# Patient Record
Sex: FEMALE | Race: WHITE | NOT HISPANIC OR LATINO | Employment: UNEMPLOYED | URBAN - METROPOLITAN AREA
[De-identification: names, ages, dates, MRNs, and addresses within clinical notes are randomized per-mention and may not be internally consistent; named-entity substitution may affect disease eponyms.]

---

## 2017-02-13 ENCOUNTER — CARE COORDINATION (OUTPATIENT)
Dept: OTOLARYNGOLOGY | Facility: CLINIC | Age: 57
End: 2017-02-13

## 2017-02-13 NOTE — PROGRESS NOTES
Per [employer], No PA is required for facial botox treatments when billing under CPT codes 37128 (Chemodenervation, face nerve muscle) and 69021.013 (Botox 25-50 units), using ICD-10 code G51.9 Facial Nerve Disorder. Pt notified.                 Dalia Mccullough RN

## 2017-04-27 ENCOUNTER — OFFICE VISIT (OUTPATIENT)
Dept: OTOLARYNGOLOGY | Facility: CLINIC | Age: 57
End: 2017-04-27
Payer: COMMERCIAL

## 2017-04-27 DIAGNOSIS — G51.8 OTHER DISORDERS OF FACIAL NERVE: Primary | ICD-10-CM

## 2017-04-27 PROCEDURE — 64612 DESTROY NERVE FACE MUSCLE: CPT | Performed by: OTOLARYNGOLOGY

## 2017-04-27 ASSESSMENT — PAIN SCALES - GENERAL: PAINLEVEL: NO PAIN (0)

## 2017-04-27 NOTE — PATIENT INSTRUCTIONS
Please contact our clinic if you have questions or if problems arise:    [place] office: [phone]  [facility] office: [phone]    If it is an urgent matter when the clinic is closed, please contact the [facility]  [phone] and ask to have Dr. Carlos Bhatt, or the ENT resident on-call paged. If it is a serious matter requiring immediate attention, please call 911 or go to your nearest emergency department.

## 2017-04-27 NOTE — NURSING NOTE
"Susanna Coto's goals for this visit include:   Chief Complaint   Patient presents with     Botox     facial botox       She requests these members of her care team be copied on today's visit information: Haritha Barber      PCP: Haritha Barber    Referring Provider:  No referring provider defined for this encounter.    Chief Complaint   Patient presents with     Botox     facial botox       Initial There were no vitals taken for this visit. Estimated body mass index is 20.14 kg/(m^2) as calculated from the following:    Height as of 9/4/14: 1.651 m (5' 5\").    Weight as of 9/4/14: 54.9 kg (121 lb).  Medication Reconciliation: complete    Do you need any medication refills at today's visit? No    Dalia Mccullough RN    "

## 2017-04-27 NOTE — MR AVS SNAPSHOT
After Visit Summary   4/27/2017    Susanna Coto    MRN: [id number]           Patient Information     Date Of Birth          1960        Visit Information        Provider Department      4/27/2017 11:00 AM Carlos Bhatt MD [facility]        Today's Diagnoses     Other disorders of facial nerve    -  1      Care Instructions    Please contact our clinic if you have questions or if problems arise:    [place] office: [phone]  [facility] office: [phone]    If it is an urgent matter when the clinic is closed, please contact the [facility]  [phone] and ask to have Dr. Carlos Bhatt, or the ENT resident on-call paged. If it is a serious matter requiring immediate attention, please call 911 or go to your nearest emergency department.          Follow-ups after your visit        Follow-up notes from your care team     Return if symptoms worsen or fail to improve.      Your next 10 appointments already scheduled     Oct 26, 2017 11:00 AM CDT   Return Visit with Carlos Bhatt MD   [facility] ([facility]    [address] [place] [address] [phone]              Who to contact     If you have questions or need follow up information about today's clinic visit or your schedule please contact [facility] directly at [phone].  Normal or non-critical lab and imaging results will be communicated to you by MyChart, letter or phone within 4 business days after the clinic has received the results. If you do not hear from us within 7 days, please contact the clinic through MyChart or phone. If you have a critical or abnormal lab result, we will notify you by phone as soon as possible.  Submit refill requests through [facility] or call your pharmacy and they will forward the refill request to us. Please allow 3 business days for your refill to be completed.           Additional Information About Your Visit        Care EveryWhere ID     This is your Care EveryWhere ID. This could be used by other organizations to access your [place] medical records  [id number]         Blood Pressure from Last 3 Encounters:   09/04/14 94/64   04/09/09 116/62   04/29/04 90/60    Weight from Last 3 Encounters:   09/04/14 54.9 kg (121 lb)   04/27/04 56.7 kg (125 lb)              We Performed the Following     BOTOX 25-50 UNITS     CHEMODENERVATION, FACE NERVE MUSCLE        Primary Care Provider Office Phone # Fax #    Haritha Jennifer Barber [phone]       [facility] [address] [address]        Thank you!     Thank you for choosing [facility]  for your care. Our goal is always to provide you with excellent care. Hearing back from our patients is one way we can continue to improve our services. Please take a few minutes to complete the written survey that you may receive in the mail after your visit with us. Thank you!             Your Updated Medication List - Protect others around you: Learn how to safely use, store and throw away your medicines at www.disposemymeds.org.          This list is accurate as of: 4/27/17 11:43 AM.  Always use your most recent med list.                   Brand Name Dispense Instructions for use    OMEGA-3 FISH OIL PO          VITAMIN E NATURAL PO

## 2017-04-27 NOTE — PROGRESS NOTES
"CLINICAL SUMMARY:   Diagnoses:  1) Facial paralysis, left, etiology acoustic neuroma excision.  7/15/2003: paralysis onset date.   8/2003: first sign of recovery.  Had physical therapy for facial paralysis in the past.  Had botox sessions in the past x2: really benefited from botox in the past without complications.   8/21/2014: platysmaectomy by Dr. Howard with large benefit of decreased banding and pain.      Comorbidities: None  Pertinent medications: Fish oil  Photographs: [facility] consents signed December 4, 2014.   Other: N/A   Care Checklist:   _Tanya Fernandes SLP for facial paralysis rehabilitation.   _Stop fish oil 2 weeks before botox injections.     Patient presents for botox injection.      \"My face is really tight through my cheek. I have also developed tightness in my right neck, the same type of tightness I had before platysmectomy. I have a lot more chin dimpling and unwanted movement that has come back. 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 There is associated tightness and discomfort with the synkinesis in her left face.  I recommend treatment with Botox.     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     Postoperative diagnosis: same.     Procedure:  Botox injection, 67u total to the following locations:  Left  5u x1  Left lateral superior orbit 10u x1  Left lateral mid orbit 5u x1  Left lateral inferior orbit 10u x1  Left chin 2.5u x3.  Left neck 5u x 4.  Right lower lip/chin (MESHA) 5u x1.  Right upper lip at maximal smile excursion 2u.           Botox informaton: Allergan Onabotulinumtoxin A 100u/vial. Lot#[id number], Expiration 9/2019, NDC 5417-2714-70.     A preoperative discussion was held. The Ms. Coto stated she understood the risks, benefits, alternatives, and limitations of the procedure. The risks, including but not limited to, bleeding, discomfort at the injection site, headache, neck pain, dry mouth, tiredness, loss " of strength in the body, hoarseness, trouble saying words, trouble breathing, trouble swallowing, eye problems such as double vision, blurred vision, decreased eyesight, drooping eyelids, swelling of eyelids, dry eyes, and unforseen complications related to the procedure were discussed. I emphasized the possibility of worsening of her eye function given her history of facial paralysis and eye dryness. I also emphasized the temporary nature of the treatment and more treatment may be necessary in the future. She stated that her questions were answered to her satisfaction. She wished to undergo the procedure.     Procedure: After informed consent was obtained, the patient was prepped with an alcohol pad and marked. Reconstituted botox 100u with a 4mL dilution of unpreserved saline was used during the procedure.       67u total was injected at each of the following sites:    Left  5u x1  Left lateral superior orbit 10u x1  Left lateral mid orbit 5u x1  Left lateral inferior orbit 10u x1  Left chin 2.5u x3.  Left neck 5u x 4.  Right lower lip/chin (MESHA) 5u x1.  Right upper lip at maximal smile excursion 2u.  Hemostasis was excellent. The patient tolerated the procedure well and was discharged home in stable condition. She was instructed to avoid rubbing the injection site for at least 36 hours, keep her head elevated for 6 hours, minimize facial movement, and to call if they have questions or if problems arise.

## 2017-09-11 ENCOUNTER — HOSPITAL ENCOUNTER (EMERGENCY)
Facility: CLINIC | Age: 57
Discharge: HOME | End: 2017-09-11
Attending: EMERGENCY MEDICINE | Admitting: EMERGENCY MEDICINE
Payer: COMMERCIAL

## 2017-09-11 VITALS
WEIGHT: 122 LBS | RESPIRATION RATE: 20 BRPM | DIASTOLIC BLOOD PRESSURE: 90 MMHG | TEMPERATURE: 100.1 F | OXYGEN SATURATION: 97 % | HEIGHT: 65 IN | SYSTOLIC BLOOD PRESSURE: 123 MMHG | BODY MASS INDEX: 20.33 KG/M2 | HEART RATE: 63 BPM

## 2017-09-11 DIAGNOSIS — J02.9 ACUTE VIRAL PHARYNGITIS: ICD-10-CM

## 2017-09-11 LAB
DEPRECATED S PYO AG THROAT QL EIA: NORMAL
SPECIMEN SOURCE: NORMAL

## 2017-09-11 PROCEDURE — 99283 EMERGENCY DEPT VISIT LOW MDM: CPT

## 2017-09-11 PROCEDURE — 87880 STREP A ASSAY W/OPTIC: CPT | Performed by: EMERGENCY MEDICINE

## 2017-09-11 PROCEDURE — 25000131 ZZH RX MED GY IP 250 OP 636 PS 637: Performed by: EMERGENCY MEDICINE

## 2017-09-11 PROCEDURE — 87081 CULTURE SCREEN ONLY: CPT | Performed by: EMERGENCY MEDICINE

## 2017-09-11 PROCEDURE — 25000132 ZZH RX MED GY IP 250 OP 250 PS 637: Performed by: EMERGENCY MEDICINE

## 2017-09-11 RX ORDER — DEXAMETHASONE 4 MG/1
8 TABLET ORAL ONCE
Status: COMPLETED | OUTPATIENT
Start: 2017-09-11 | End: 2017-09-11

## 2017-09-11 RX ORDER — IBUPROFEN 600 MG/1
600 TABLET, FILM COATED ORAL ONCE
Status: COMPLETED | OUTPATIENT
Start: 2017-09-11 | End: 2017-09-11

## 2017-09-11 RX ADMIN — DEXAMETHASONE 8 MG: 4 TABLET ORAL at 01:14

## 2017-09-11 RX ADMIN — IBUPROFEN 600 MG: 600 TABLET ORAL at 01:14

## 2017-09-11 ASSESSMENT — ENCOUNTER SYMPTOMS
NUMBNESS: 1
FEVER: 1
TROUBLE SWALLOWING: 1
COUGH: 0
CHILLS: 1
SORE THROAT: 1

## 2017-09-11 NOTE — ED AVS SNAPSHOT
[facility] Emergency Department    [address]    [facility] [address]    Phone:  [phone]    Fax:  [phone]                                       Susanna Coto   MRN: [id number]    Department:  [facility] Emergency Department   Date of Visit:  9/11/2017           After Visit Summary Signature Page     I have received my discharge instructions, and my questions have been answered. I have discussed any challenges I see with this plan with the nurse or doctor.    ..........................................................................................................................................  Patient/Patient Representative Signature      ..........................................................................................................................................  Patient Representative Print Name and Relationship to Patient    ..................................................               ................................................  Date                                            Time    ..........................................................................................................................................  Reviewed by Signature/Title    ...................................................              ..............................................  Date                                                            Time

## 2017-09-11 NOTE — ED AVS SNAPSHOT
[facility] Emergency Department    [address] [place] [address]    Phone:  [phone]    Fax:  [phone]                                       Susanna Coto   MRN: [id number]    Department:  [facility] Emergency Department   Date of Visit:  9/11/2017           Patient Information     Date Of Birth          1960        Your diagnoses for this visit were:     Acute viral pharyngitis        You were seen by Fish Cruz MD.      Follow-up Information     Follow up with Haritha Barber MD.    Specialty:  OB/Gyn    Why:  Tuesday as scheduled    Contact information:    [facility]  [address]  [facility] [address] [phone]          Discharge Instructions         Viral Pharyngitis (Sore Throat)    You (or your child, if your child is the patient) have pharyngitis (sore throat). This infection is caused by a virus. It can cause throat pain that is worse when swallowing, aching all over, headache, and fever. The infection may be spread by coughing, kissing, or touching others after touching your mouth or nose. Antibiotic medications do not work against viruses, so they are not used for treating this condition.  Home care    If your symptoms are severe, rest at home. Return to work or school when you feel well enough.     Drink plenty of fluids to avoid dehydration.    For children: Use acetaminophen for fever, fussiness or discomfort. In infants over six months of age, you may use ibuprofen instead of acetaminophen. (NOTE: If your child has chronic liver or kidney disease or ever had a stomach ulcer or GI bleeding, talk with your doctor before using these medicines.) (NOTE: Aspirin should never be used in anyone under 18 years of age who is ill with a fever. It may cause severe liver damage.)     For adults: You may use acetaminophen or ibuprofen to control pain or fever, unless another medicine was prescribed for this. (NOTE:  If you have chronic liver or kidney disease or ever had a stomach ulcer or GI bleeding, talk with your doctor before using these medicines.)    Throat lozenges or numbing throat sprays can help reduce pain. Gargling with warm salt water will also help reduce throat pain. For this, dissolve 1/2 teaspoon of salt in 1 glass of warm water. To help soothe a sore throat, children can sip on juice or a popsicle. Children 5 years and older can also suck on a lollipop or hard candy.    Avoid salty or spicy foods, which can be irritating to the throat.  Follow-up care  Follow up with your healthcare provider or our staff if you are not improving over the next week.  When to seek medical advice  Call your healthcare provider right away if any of these occur:    Fever as directed by your doctor.  For children, seek care if:    Your child is of any age and has repeated fevers above 104 F (40 C).    Your child is younger than 2 years of age and has a fever of 100.4 F (38 C) that continues for more than 1 day.    Your child is 2 years old or older and has a fever of 100.4 F (38 C) that continues for more than 3 days.    New or worsening ear pain, sinus pain, or headache    Painful lumps in the back of neck    Stiff neck    Lymph nodes are getting larger    Inability to swallow liquids, excessive drooling, or inability to open mouth wide due to throat pain    Signs of dehydration (very dark urine or no urine, sunken eyes, dizziness)    Trouble breathing or noisy breathing    Muffled voice    New rash    Child appears to be getting sicker  Date Last Reviewed: 4/13/2015 2000-2017 [employer]. [address] [place] [address]. All rights reserved. This information is not intended as a substitute for professional medical care. Always follow your healthcare professional's instructions.          Future Appointments        Provider Department Dept Phone Center    10/26/2017 11:00 AM Carlos Bhatt MD [facility]  [facility] [phone] [place]      24 Hour Appointment Hotline       To make an appointment at any [facility], call [phone] ([phone]). If you don't have a family doctor or clinic, we will help you find one. [facility] are conveniently located to serve the needs of you and your family.             Review of your medicines      Our records show that you are taking the medicines listed below. If these are incorrect, please call your family doctor or clinic.        Dose / Directions Last dose taken    OMEGA-3 FISH OIL PO        Refills:  0        VITAMIN E NATURAL PO        Refills:  0                Procedures and tests performed during your visit     Beta strep group A culture    Rapid strep screen      Orders Needing Specimen Collection     None      Pending Results     Date and Time Order Name Status Description    9/11/2017 0046 Beta strep group A culture In process             Pending Culture Results     Date and Time Order Name Status Description    9/11/2017 0046 Beta strep group A culture In process             Pending Results Instructions     If you had any lab results that were not finalized at the time of your Discharge, you can call the ED Lab Result RN at [phone]. You will be contacted by this team for any positive Lab results or changes in treatment. The nurses are available 7 days a week from 10A to 6:30P.  You can leave a message 24 hours per day and they will return your call.        Test Results From Your Hospital Stay        9/11/2017  1:12 AM      Component Results     Component    Specimen Description    Throat    Rapid Strep A Screen    NEGATIVE: No Group A streptococcal antigen detected by immunoassay, await culture report.         9/11/2017  1:13 AM                Clinical Quality Measure: Blood Pressure Screening     Your blood pressure was checked while you were in the emergency department today. The last reading we obtained was  BP: 123/90 . Please  "read the guidelines below about what these numbers mean and what you should do about them.  If your systolic blood pressure (the top number) is less than 120 and your diastolic blood pressure (the bottom number) is less than 80, then your blood pressure is normal. There is nothing more that you need to do about it.  If your systolic blood pressure (the top number) is 120-139 or your diastolic blood pressure (the bottom number) is 80-89, your blood pressure may be higher than it should be. You should have your blood pressure rechecked within a year by a primary care provider.  If your systolic blood pressure (the top number) is 140 or greater or your diastolic blood pressure (the bottom number) is 90 or greater, you may have high blood pressure. High blood pressure is treatable, but if left untreated over time it can put you at risk for heart attack, stroke, or kidney failure. You should have your blood pressure rechecked by a primary care provider within the next 4 weeks.  If your provider in the emergency department today gave you specific instructions to follow-up with your doctor or provider even sooner than that, you should follow that instruction and not wait for up to 4 weeks for your follow-up visit.        Thank you for choosing [facility]       Thank you for choosing [facility] for your care. Our goal is always to provide you with excellent care. Hearing back from our patients is one way we can continue to improve our services. Please take a few minutes to complete the written survey that you may receive in the mail after you visit with us. Thank you!        [employer] Information     [employer] lets you send messages to your doctor, view your test results, renew your prescriptions, schedule appointments and more. To sign up, go to [email or web address] . Click on \"Log in\" on the left side of the screen, which will take you to the Welcome page. Then click on \"Sign up Now\" on the right side of the page.     You " will be asked to enter the access code listed below, as well as some personal information. Please follow the directions to create your username and password.     Your access code is: [email or web address]  Expires: 12/10/2017  1:43 AM     Your access code will  in 90 days. If you need help or a new code, please call your [facility] clinic or [phone].        Care EveryWhere ID     This is your Care EveryWhere ID. This could be used by other organizations to access your [facility] medical records  [id number]        Equal Access to Services     [facility]: Shad Vences, darline mendez, ykra dwyer, stephanie domínguez . So [facility] [phone].    ATENCIÓN: Si habla español, tiene a cosby disposición servicios gratuitos de asistencia lingüística. Surendra al [phone].    We comply with applicable federal civil rights laws and [place] laws. We do not discriminate on the basis of race, color, national origin, age, disability sex, sexual orientation or gender identity.            After Visit Summary       This is your record. Keep this with you and show to your community pharmacist(s) and doctor(s) at your next visit.

## 2017-09-11 NOTE — ED NOTES
Patient took GI cocktail after Dr Cruz talked to her, unable to scan at this time as patient had left.

## 2017-09-11 NOTE — ED PROVIDER NOTES
"  History     Chief Complaint:  Pharyngitis    HPI   Susanna Coto is a 57 year old female who presents to the emergency department today for evaluation of pharyngitis. The patient reports that two to three days ago she developed a sore throat that eventually localized to the right side. She notes that the pain radiates to her right ear. She also reports some chills and has a temperature of 100.1 upon presentation in the emergency department, although she did not take her temperature at home. She notes pain with swallowing as well. She states that she has a history of platysmaectomy and notes that over the past few days she has had some numbness under her chin that felt similar to what she experienced following this procedure. She denies cough.    Allergies:  Penicillins     Medications:    Medications reviewed. No current medications.    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   HENT: Positive for ear pain (right), sore throat (right-sided) and trouble swallowing (pain).    Respiratory: Negative for cough.    Neurological: Positive for numbness (chin).   All other systems reviewed and are negative.    Physical Exam     Vitals:  Patient Vitals for the past 24 hrs:   BP Temp Temp src Pulse Resp SpO2 Height Weight   09/11/17 0040 123/90 100.1  F (37.8  C) Temporal 63 20 97 % 1.651 m (5' 5\") 55.3 kg (122 lb)     Physical Exam  Nursing note and vitals reviewed.  Constitutional: Cooperative.   HENT:   Mouth/Throat: Mucous membranes are normal. No sublingual or submental swelling. Posterior oropharynx is erythematous. No abscess, exudate, or uvular deviation. Phonation normal. " Tolerating secretions and sipping water.   Neck: Right sided tender cervical lymphadenopathy. No neck rigidity.  Ears: TM's normal.  Eyes: No discharge  Cardiovascular: Normal rate, regular rhythm and normal heart sounds.  No murmur.  Pulmonary/Chest: Effort normal and breath sounds normal. No respiratory distress.   Neurological: Alert.   Skin: Skin is warm and dry. No rash noted.     Emergency Department Course     Laboratory:  Laboratory findings were communicated with the patient who voiced understanding of the findings.    Rapid strep screen (throat): Negative  Beta strep group A culture: pending    Interventions:  0114 Decadron 8 mg PO  0114 Ibuprofen 600 mg PO  0233 GI cocktail 30 mL's PO    Emergency Department Course:  Nursing notes and vitals reviewed.  I performed an exam of the patient as documented above.   0138 Patient was rechecked and updated regarding rapid strep result.  0222 The patient complained to the nurse that she felt like she had a pill stuck in her throat. She was offered a GI cocktail, which she declined.  0233 Patient was rechecked prior to discharge by myself and after discussion, took the GI cocktail.  I discussed the treatment plan with the patient. They expressed understanding of this plan and consented to discharge. They will be discharged home with instructions for care and follow up. In addition, the patient will return to the emergency department if their symptoms persist, worsen, if new symptoms arise or if there is any concern.  All questions were answered.  I personally reviewed the laboratory results with the patient and answered all related questions prior to discharge.    Impression & Plan      Medical Decision Making:  Susanna Coto is a 57 year old female who presented with sore throat and clinical evidence of pharyngitis, likely 2/2 viral etiology.  The rapid strep test is negative, and formal culture has been set up in the lab. There is no clinical evidence of   peritonsillar abscess, retropharyngeal abscess, Lemierre's Syndrome, epiglottis, or Jesus's angina. I have recommended treatment with analgesics, and we will await formal culture results.  If the culture is positive, an ED physician will call the patient to initiate anti-microbial therapy. Return if increasing pain, change in voice, neck pain, vomiting, fever, or shortness of breath. Follow-up with primary physician if not improving in 3-5 days.    Addendum: I was called back to the room as the patient was very frustrated with her care. She had complaints that the room was dirty and that she was not being communicated to. She was also concerned that she had a pill stuck in her throat. She is easily able to swallow water so I doubt obstructive pathology in her esophagus. She might have mild pill esophagitis from a temporarily lodged motrin tablet. She was offered a GI cocktail. There was a miscommunication with her and her nurse. She thought the nurse said it was a laxative when it was not, which had her very concerned. I have discussed with the patient that this was an antacid and a numbing medication so she now knows what the medicine was and that it was simply being given for her comfort. She was also concerned that her discharge paperwork had vitamin E and omega 3 acids on them when she stated that she is no longer taking these. I explained to her that the nurse did not click that she was taking them but that the nurse is per protocol not allowed to remove medicines from the medicine list. I have since gone back in and removed these medicines per the patient's request. She will be discharged home.    Diagnosis:    ICD-10-CM    1. Acute viral pharyngitis J02.8     B97.89      Disposition:  The patient is discharged to home.  Scribe Disclosure:  I, Taurus Griffiths, am serving as a scribe at 1:01 AM on 9/11/2017 to document services personally performed by Fish Cruz MD based on my observations and the provider's  statements to me.  [facility] EMERGENCY DEPARTMENT       Fish Cruz MD  09/11/17 7261

## 2017-09-11 NOTE — ED NOTES
"Went into room because patient requesting a nurse.  Patient with multiple complaints.  Complains of dirty rooms, tray dirtty, refusing to take medications. Refusing GI cocktail or any offers of pop, water etc...  Patient being unreasonable with her requests.  States we are not updating her med record.  Two nurses ( Maddie) have attempted to discharge patient, Pt will not leave, continues to check cell phone regarding medications, Requesting to see MD multiple times.  \"If I was at [facility]\".... Unable to make patient agreeable at this time.   "

## 2017-09-11 NOTE — DISCHARGE INSTRUCTIONS
Viral Pharyngitis (Sore Throat)    You (or your child, if your child is the patient) have pharyngitis (sore throat). This infection is caused by a virus. It can cause throat pain that is worse when swallowing, aching all over, headache, and fever. The infection may be spread by coughing, kissing, or touching others after touching your mouth or nose. Antibiotic medications do not work against viruses, so they are not used for treating this condition.  Home care    If your symptoms are severe, rest at home. Return to work or school when you feel well enough.     Drink plenty of fluids to avoid dehydration.    For children: Use acetaminophen for fever, fussiness or discomfort. In infants over six months of age, you may use ibuprofen instead of acetaminophen. (NOTE: If your child has chronic liver or kidney disease or ever had a stomach ulcer or GI bleeding, talk with your doctor before using these medicines.) (NOTE: Aspirin should never be used in anyone under 18 years of age who is ill with a fever. It may cause severe liver damage.)     For adults: You may use acetaminophen or ibuprofen to control pain or fever, unless another medicine was prescribed for this. (NOTE: If you have chronic liver or kidney disease or ever had a stomach ulcer or GI bleeding, talk with your doctor before using these medicines.)    Throat lozenges or numbing throat sprays can help reduce pain. Gargling with warm salt water will also help reduce throat pain. For this, dissolve 1/2 teaspoon of salt in 1 glass of warm water. To help soothe a sore throat, children can sip on juice or a popsicle. Children 5 years and older can also suck on a lollipop or hard candy.    Avoid salty or spicy foods, which can be irritating to the throat.  Follow-up care  Follow up with your healthcare provider or our staff if you are not improving over the next week.  When to seek medical advice  Call your healthcare provider right away if any of these  occur:    Fever as directed by your doctor.  For children, seek care if:    Your child is of any age and has repeated fevers above 104 F (40 C).    Your child is younger than 2 years of age and has a fever of 100.4 F (38 C) that continues for more than 1 day.    Your child is 2 years old or older and has a fever of 100.4 F (38 C) that continues for more than 3 days.    New or worsening ear pain, sinus pain, or headache    Painful lumps in the back of neck    Stiff neck    Lymph nodes are getting larger    Inability to swallow liquids, excessive drooling, or inability to open mouth wide due to throat pain    Signs of dehydration (very dark urine or no urine, sunken eyes, dizziness)    Trouble breathing or noisy breathing    Muffled voice    New rash    Child appears to be getting sicker  Date Last Reviewed: 4/13/2015 2000-2017 [employer]. [address] [place] [address], [place] [address]. All rights reserved. This information is not intended as a substitute for professional medical care. Always follow your healthcare professional's instructions.

## 2017-09-13 LAB
BACTERIA SPEC CULT: NORMAL
SPECIMEN SOURCE: NORMAL

## 2017-10-26 ENCOUNTER — OFFICE VISIT (OUTPATIENT)
Dept: OTOLARYNGOLOGY | Facility: CLINIC | Age: 57
End: 2017-10-26
Payer: COMMERCIAL

## 2017-10-26 DIAGNOSIS — G51.9 FACIAL NERVE DISORDER: Primary | ICD-10-CM

## 2017-10-26 PROCEDURE — 64612 DESTROY NERVE FACE MUSCLE: CPT | Performed by: OTOLARYNGOLOGY

## 2017-10-26 NOTE — PROGRESS NOTES
"CLINICAL SUMMARY:   Diagnoses:  1) Facial paralysis, left, etiology acoustic neuroma excision.  7/15/2003: paralysis onset date.   8/2003: first sign of recovery.  Had physical therapy for facial paralysis in the past.  Had botox sessions in the past x2: really benefited from botox in the past without complications.   8/21/2014: platysmaectomy by Dr. Howard with large benefit of decreased banding and pain.       Comorbidities: None  Pertinent medications: Fish oil  Photographs: [facility] consents signed December 4, 2014.   Other: N/A   Care Checklist:   _Tanya Fernandes SLP for facial paralysis rehabilitation.   _Stop fish oil 2 weeks before botox injections.            Patient presents for botox injection.       \"I have noticed in the last few weeks I have been dribbling from my mouth more. I have noticed tightness of my cheek. The chin dimpling is back. I have noticed a little bit of unwanted eye closure with eating and talking.\" There is associated tightness and discomfort with the synkinesis in her left face.  I recommend treatment with Botox.      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      Postoperative diagnosis: same.      Procedure:  Botox injection, 67u total to the following locations:  Left  5u x1  Left lateral superior orbit 10u x1  Left lateral mid orbit 5u x1  Left lateral inferior orbit 10u x1  Left chin 2.5u x3.  Left neck 5u x 4.  Right lower lip/chin (MESHA) 5u x1.  Right upper lip at maximal smile excursion 2u.              Botox informaton: Allergan Onabotulinumtoxin A 100u/vial. Lot#[id number] Expiration 3/2020, NDC 5329-5427-93.      A preoperative discussion was held. The Ms. Coto stated she understood the risks, benefits, alternatives, and limitations of the procedure. The risks, including but not limited to, bleeding, discomfort at the injection site, headache, neck pain, dry mouth, tiredness, loss of strength in the body, hoarseness, trouble saying words, " trouble breathing, trouble swallowing, eye problems such as double vision, blurred vision, decreased eyesight, drooping eyelids, swelling of eyelids, dry eyes, and unforseen complications related to the procedure were discussed. I emphasized the possibility of worsening of her eye function given her history of facial paralysis and eye dryness. I also emphasized the temporary nature of the treatment and more treatment may be necessary in the future. She stated that her questions were answered to her satisfaction. She wished to undergo the procedure.      Procedure: After informed consent was obtained, the patient was prepped with an alcohol pad and marked. Reconstituted botox 100u with a 2mL dilution of unpreserved saline was used during the procedure.        67u total was injected at each of the following sites:    Left  5u x1  Left lateral superior orbit 10u x1  Left lateral mid orbit 5u x1  Left lateral inferior orbit 10u x1  Left chin 2.5u x3.  Left neck 5u x 4.  Right lower lip/chin (MESHA) 5u x1.  Right upper lip at maximal smile excursion 2u.  Hemostasis was excellent. The patient tolerated the procedure well and was discharged home in stable condition. She was instructed to avoid rubbing the injection site for at least 36 hours, keep her head elevated for 6 hours, minimize facial movement, and to call if they have questions or if problems arise.

## 2017-10-26 NOTE — LETTER
"10/26/2017       RE: Susanna Coto  [id number] [facility] [id number]     Dear Colleague,    Thank you for referring your patient, Susanna Coto, to the [facility]. Please see a copy of my visit note below.    CLINICAL SUMMARY:   Diagnoses:  1) Facial paralysis, left, etiology acoustic neuroma excision.  7/15/2003: paralysis onset date.   8/2003: first sign of recovery.  Had physical therapy for facial paralysis in the past.  Had botox sessions in the past x2: really benefited from botox in the past without complications.   8/21/2014: platysmaectomy by Dr. Howard with large benefit of decreased banding and pain.       Comorbidities: None  Pertinent medications: Fish oil  Photographs: [facility] consents signed December 4, 2014.   Other: N/A   Care Checklist:   _Tanya Fernandes SLP for facial paralysis rehabilitation.   _Stop fish oil 2 weeks before botox injections.            Patient presents for botox injection.       \"I have noticed in the last few weeks I have been dribbling from my mouth more. I have noticed tightness of my cheek. The chin dimpling is back. I have noticed a little bit of unwanted eye closure with eating and talking.\" There is associated tightness and discomfort with the synkinesis in her left face.  I recommend treatment with Botox.      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      Postoperative diagnosis: same.      Procedure:  Botox injection, 67u total to the following locations:  Left  5u x1  Left lateral superior orbit 10u x1  Left lateral mid orbit 5u x1  Left lateral inferior orbit 10u x1  Left chin 2.5u x3.  Left neck 5u x 4.  Right lower lip/chin (MESHA) 5u x1.  Right upper lip at maximal smile excursion 2u.              Botox informaton: Allergan Onabotulinumtoxin A 100u/vial. Lot#[id number] Expiration 3/2020, NDC 6909-5908-32.      A preoperative discussion was held. " The Ms. Coto stated she understood the risks, benefits, alternatives, and limitations of the procedure. The risks, including but not limited to, bleeding, discomfort at the injection site, headache, neck pain, dry mouth, tiredness, loss of strength in the body, hoarseness, trouble saying words, trouble breathing, trouble swallowing, eye problems such as double vision, blurred vision, decreased eyesight, drooping eyelids, swelling of eyelids, dry eyes, and unforseen complications related to the procedure were discussed. I emphasized the possibility of worsening of her eye function given her history of facial paralysis and eye dryness. I also emphasized the temporary nature of the treatment and more treatment may be necessary in the future. She stated that her questions were answered to her satisfaction. She wished to undergo the procedure.      Procedure: After informed consent was obtained, the patient was prepped with an alcohol pad and marked. Reconstituted botox 100u with a 2mL dilution of unpreserved saline was used during the procedure.        67u total was injected at each of the following sites:    Left  5u x1  Left lateral superior orbit 10u x1  Left lateral mid orbit 5u x1  Left lateral inferior orbit 10u x1  Left chin 2.5u x3.  Left neck 5u x 4.  Right lower lip/chin (MESHA) 5u x1.  Right upper lip at maximal smile excursion 2u.  Hemostasis was excellent. The patient tolerated the procedure well and was discharged home in stable condition. She was instructed to avoid rubbing the injection site for at least 36 hours, keep her head elevated for 6 hours, minimize facial movement, and to call if they have questions or if problems arise.    Again, thank you for allowing me to participate in the care of your patient.      Sincerely,    Carlos Bhatt MD

## 2017-10-26 NOTE — NURSING NOTE
"Susanna Coto's goals for this visit include:   Chief Complaint   Patient presents with     RECHECK     Botox       She requests these members of her care team be copied on today's visit information: Naz Toledo Ms      PCP: Naz Toledo Ms    Referring Provider:  No referring provider defined for this encounter.    Chief Complaint   Patient presents with     RECHECK     Botox       Initial There were no vitals taken for this visit. Estimated body mass index is 20.3 kg/(m^2) as calculated from the following:    Height as of 9/11/17: 1.651 m (5' 5\").    Weight as of 9/11/17: 55.3 kg (122 lb).  Medication Reconciliation: complete    Do you need any medication refills at today's visit? No    Sunni Arita LPN      "

## 2018-03-21 ENCOUNTER — TELEPHONE (OUTPATIENT)
Dept: OTOLARYNGOLOGY | Facility: CLINIC | Age: 58
End: 2018-03-21

## 2018-03-21 NOTE — TELEPHONE ENCOUNTER
Prior authorization for theraputic botox through [employer] [place] received.  Reference number [id number] approved 3/29/18- 3/28/19.  Pt notified.  Dalia Mccullough RN

## 2018-03-29 ENCOUNTER — OFFICE VISIT (OUTPATIENT)
Dept: OTOLARYNGOLOGY | Facility: CLINIC | Age: 58
End: 2018-03-29
Payer: COMMERCIAL

## 2018-03-29 DIAGNOSIS — G51.9 FACIAL NERVE DISORDER: Primary | ICD-10-CM

## 2018-03-29 PROCEDURE — 64612 DESTROY NERVE FACE MUSCLE: CPT | Performed by: OTOLARYNGOLOGY

## 2018-03-29 NOTE — PROGRESS NOTES
"CLINICAL SUMMARY:   Diagnoses:  1) Facial paralysis, left, etiology acoustic neuroma excision.  7/15/2003: paralysis onset date.   8/2003: first sign of recovery.  Had physical therapy for facial paralysis in the past.  Had botox sessions in the past x2: really benefited from botox in the past without complications.   8/21/2014: platysmaectomy by Dr. Howard with large benefit of decreased banding and pain.       Comorbidities: None  Pertinent medications: Fish oil  Photographs: [facility] consents signed December 4, 2014.   Other: N/A   Care Checklist:   _Tanya Fernandes SLP for facial paralysis rehabilitation.   _Stop fish oil 2 weeks before botox injections.      Patient presents for botox injection.       \"I have noticed in the last few weeks I have been dribbling from my mouth more. I have noticed tightness of my cheek. The chin dimpling is back. I have noticed a little bit of unwanted eye closure with eating and talking.\" There is associated tightness and discomfort with the synkinesis in her left face.  I recommend treatment with Botox.      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      Postoperative diagnosis: same.      Procedure:  Botox injection, 67u total to the following locations:  Left  5u x1  Left lateral superior orbit 10u x1  Left lateral mid orbit 5u x1  Left lateral inferior orbit 10u x1  Left chin 2.5u x3.  Left neck 5u x 4.  Right lower lip/chin (MESHA) 5u x1.  Right upper lip at maximal smile excursion 2u.          Patient presents for botox injection.       \"I have noticed my head pain is back. It can rotate. It is not like a headache. It rotates behind my eye. That happens when the botox is wearing off. The botox relieves this. I can feel the eye tightness. It has been harder to sleep because of neck pain.\" There is associated tightness and discomfort with the synkinesis in her left face.  I recommend treatment with Botox.      Preoperative diagnosis:   Left " facial synkinesis after facial paralysis.      Postoperative diagnosis: same.      Procedure:  Botox injection, 67u total to the following locations:  Left  5u x1  Left lateral superior orbit 10u x1  Left lateral mid orbit 5u x1  Left lateral inferior orbit 10u x1  Left chin 2.5u x3.  Left neck 5u x 4.  Right lower lip/chin (MESHA) 5u x1.  Right upper lip at maximal smile excursion 2u.      Botox informaton: [employer]Chat Onabotulinumtoxin A 100u/vial. Lot#[id number] Expiration 10/2020, NDC 9950-2866-94.      A preoperative discussion was held. The Ms. Coto stated she understood the risks, benefits, alternatives, and limitations of the procedure. The risks, including but not limited to, bleeding, discomfort at the injection site, headache, neck pain, dry mouth, tiredness, loss of strength in the body, hoarseness, trouble saying words, trouble breathing, trouble swallowing, eye problems such as double vision, blurred vision, decreased eyesight, drooping eyelids, swelling of eyelids, dry eyes, and unforseen complications related to the procedure were discussed. I emphasized the possibility of worsening of her eye function given her history of facial paralysis and eye dryness. I also emphasized the temporary nature of the treatment and more treatment may be necessary in the future. She stated that her questions were answered to her satisfaction. She wished to undergo the procedure.      Procedure: After informed consent was obtained, the patient was prepped with an alcohol pad and marked. Reconstituted botox 100u with a 2mL dilution of unpreserved saline was used during the procedure.        67u total was injected at each of the following sites:    Left  5u x1  Left lateral superior orbit 10u x1  Left lateral mid orbit 5u x1  Left lateral inferior orbit 10u x1  Left chin 2.5u x3.  Left neck 5u x 4.  Right lower lip/chin (MESHA) 5u x1.  Right upper lip at maximal smile excursion 2u.  Hemostasis was excellent.  The patient tolerated the procedure well and was discharged home in stable condition. She was instructed to avoid rubbing the injection site for at least 36 hours, keep her head elevated for 6 hours, minimize facial movement, and to call if they have questions or if problems arise.

## 2018-03-29 NOTE — LETTER
"    3/29/2018         RE: Susanan Coto  [address] [facility] [address]        Dear Colleague,    Thank you for referring your patient, Susanna Coto, to the [facility]. Please see a copy of my visit note below.    CLINICAL SUMMARY:   Diagnoses:  1) Facial paralysis, left, etiology acoustic neuroma excision.  7/15/2003: paralysis onset date.   8/2003: first sign of recovery.  Had physical therapy for facial paralysis in the past.  Had botox sessions in the past x2: really benefited from botox in the past without complications.   8/21/2014: platysmaectomy by Dr. Howard with large benefit of decreased banding and pain.       Comorbidities: None  Pertinent medications: Fish oil  Photographs: [facility] consents signed December 4, 2014.   Other: N/A   Care Checklist:   _Tanya Fernandes SLP for facial paralysis rehabilitation.   _Stop fish oil 2 weeks before botox injections.      Patient presents for botox injection.       \"I have noticed in the last few weeks I have been dribbling from my mouth more. I have noticed tightness of my cheek. The chin dimpling is back. I have noticed a little bit of unwanted eye closure with eating and talking.\" There is associated tightness and discomfort with the synkinesis in her left face.  I recommend treatment with Botox.      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      Postoperative diagnosis: same.      Procedure:  Botox injection, 67u total to the following locations:  Left  5u x1  Left lateral superior orbit 10u x1  Left lateral mid orbit 5u x1  Left lateral inferior orbit 10u x1  Left chin 2.5u x3.  Left neck 5u x 4.  Right lower lip/chin (MESHA) 5u x1.  Right upper lip at maximal smile excursion 2u.          Patient presents for botox injection.       \"I have noticed my head pain is back. It can rotate. It is not like a headache. It rotates behind my eye. That happens when the botox is wearing off. The botox " "relieves this. I can feel the eye tightness. It has been harder to sleep because of neck pain.\" There is associated tightness and discomfort with the synkinesis in her left face.  I recommend treatment with Botox.      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      Postoperative diagnosis: same.      Procedure:  Botox injection, 67u total to the following locations:  Left  5u x1  Left lateral superior orbit 10u x1  Left lateral mid orbit 5u x1  Left lateral inferior orbit 10u x1  Left chin 2.5u x3.  Left neck 5u x 4.  Right lower lip/chin (MESHA) 5u x1.  Right upper lip at maximal smile excursion 2u.      Botox informaton: E-House Onabotulinumtoxin A 100u/vial. Lot#[id number] Expiration  10/2020, NDC 4693-0939-33.      A preoperative discussion was held. The Ms. Coto stated she understood the risks, benefits, alternatives, and limitations of the procedure. The risks, including but not limited to, bleeding, discomfort at the injection site, headache, neck pain, dry mouth, tiredness, loss of strength in the body, hoarseness, trouble saying words, trouble breathing, trouble swallowing, eye problems such as double vision, blurred vision, decreased eyesight, drooping eyelids, swelling of eyelids, dry eyes, and unforseen complications related to the procedure were discussed. I emphasized the possibility of worsening of her eye function given her history of facial paralysis and eye dryness. I also emphasized the temporary nature of the treatment and more treatment may be necessary in the future. She stated that her questions were answered to her satisfaction. She wished to undergo the procedure.      Procedure: After informed consent was obtained, the patient was prepped with an alcohol pad and marked. Reconstituted botox 100u with a 2mL dilution of unpreserved saline was used during the procedure.        67u total was injected at each of the following sites:    Left  5u " x1  Left lateral superior orbit 10u x1  Left lateral mid orbit 5u x1  Left lateral inferior orbit 10u x1  Left chin 2.5u x3.  Left neck 5u x 4.  Right lower lip/chin (MESHA) 5u x1.  Right upper lip at maximal smile excursion 2u.  Hemostasis was excellent. The patient tolerated the procedure well and was discharged home in stable condition. She was instructed to avoid rubbing the injection site for at least 36 hours, keep her head elevated for 6 hours, minimize facial movement, and to call if they have questions or if problems arise.    Again, thank you for allowing me to participate in the care of your patient.        Sincerely,        Carlos Bhatt MD

## 2018-03-29 NOTE — MR AVS SNAPSHOT
After Visit Summary   3/29/2018    Susanna Coto    MRN: [id number]           Patient Information     Date Of Birth          1960        Visit Information        Provider Department      3/29/2018 11:00 AM Carlos Bhatt MD [facility]        Today's Diagnoses     Facial nerve disorder    -  1       Follow-ups after your visit        Follow-up notes from your care team     Return if symptoms worsen or fail to improve.      Who to contact     If you have questions or need follow up information about today's clinic visit or your schedule please contact [facility] directly at [phone].  Normal or non-critical lab and imaging results will be communicated to you by MyChart, letter or phone within 4 business days after the clinic has received the results. If you do not hear from us within 7 days, please contact the clinic through MyChart or phone. If you have a critical or abnormal lab result, we will notify you by phone as soon as possible.  Submit refill requests through [employer]Â® or call your pharmacy and they will forward the refill request to us. Please allow 3 business days for your refill to be completed.          Additional Information About Your Visit        MyChart Information     [employer]Â® is an electronic gateway that provides easy, online access to your medical records. With [employer]®, you can request a clinic appointment, read your test results, renew a prescription or communicate with your care team.     To sign up for [employer]Â® visit the website at [email or web address]   You will be asked to enter the access code listed below, as well as some personal information. Please follow the directions to create your username and password.     Your access code is: [email or web address]  Expires: 2018 12:11 PM     Your access code will  in 90 days. If you need help or a new code, please contact your [facility] or call  [phone] for assistance.        Care EveryWhere ID     This is your Care EveryWhere ID. This could be used by other organizations to access your [place] medical records  [id number]         Blood Pressure from Last 3 Encounters:   09/11/17 123/90   09/04/14 94/64   04/09/09 116/62    Weight from Last 3 Encounters:   09/11/17 55.3 kg (122 lb)   09/04/14 54.9 kg (121 lb)   04/27/04 56.7 kg (125 lb)              We Performed the Following     BOTOX 25-50 UNITS     CHEMODENERVATION, FACE NERVE MUSCLE        Primary Care Provider Office Phone # Fax #    Naz Toledo [phone]       RENEE KUHN [address]  [place] [address]        Equal Access to Services     [facility]: Hadii aad ku hadasho Soomaali, waaxda luqadaha, qaybta kaalmada adeegyada, waxdeb domínguez . So [facility] [phone].    ATENCIÓN: Si habla español, tiene a cosby disposición servicios gratuitos de asistencia lingüística. Surendra al [phone].    We comply with applicable federal civil rights laws and [place] laws. We do not discriminate on the basis of race, color, national origin, age, disability, sex, sexual orientation, or gender identity.            Thank you!     Thank you for choosing [facility]  for your care. Our goal is always to provide you with excellent care. Hearing back from our patients is one way we can continue to improve our services. Please take a few minutes to complete the written survey that you may receive in the mail after your visit with us. Thank you!             Your Updated Medication List - Protect others around you: Learn how to safely use, store and throw away your medicines at www.disposemymeds.org.      Notice  As of 3/29/2018 12:11 PM    You have not been prescribed any medications.

## 2018-04-20 ENCOUNTER — TELEPHONE (OUTPATIENT)
Dept: OTOLARYNGOLOGY | Facility: CLINIC | Age: 58
End: 2018-04-20

## 2018-04-20 NOTE — TELEPHONE ENCOUNTER
"Phone call received from pt reporting in following theraputic facial botox treatment on 3/29/18.  Pt states injections have overall been helpful.  She is experiencing one area between corner of mouth and cheekbone of tightness, like it is \"stuck\".  Will review with Dr Bhatt and adjust next dose as needed.  Pt to call back if questions or concerns arise.  Dalia Mccullough RN  "

## 2018-11-15 ENCOUNTER — OFFICE VISIT (OUTPATIENT)
Dept: OTOLARYNGOLOGY | Facility: CLINIC | Age: 58
End: 2018-11-15
Payer: COMMERCIAL

## 2018-11-15 DIAGNOSIS — G51.9 FACIAL NERVE DISORDER: Primary | ICD-10-CM

## 2018-11-15 PROCEDURE — 64612 DESTROY NERVE FACE MUSCLE: CPT | Performed by: OTOLARYNGOLOGY

## 2018-11-15 NOTE — MR AVS SNAPSHOT
After Visit Summary   11/15/2018    Susanna Coto    MRN: [id number]           Patient Information     Date Of Birth          1960        Visit Information        Provider Department      11/15/2018 11:15 AM Carlos Bhatt MD [facility]        Today's Diagnoses     Facial nerve disorder    -  1       Follow-ups after your visit        Follow-up notes from your care team     Return if symptoms worsen or fail to improve.      Who to contact     If you have questions or need follow up information about today's clinic visit or your schedule please contact [facility] directly at [phone].  Normal or non-critical lab and imaging results will be communicated to you by MyChart, letter or phone within 4 business days after the clinic has received the results. If you do not hear from us within 7 days, please contact the clinic through MyChart or phone. If you have a critical or abnormal lab result, we will notify you by phone as soon as possible.  Submit refill requests through [email or web address] or call your pharmacy and they will forward the refill request to us. Please allow 3 business days for your refill to be completed.          Additional Information About Your Visit        MyChart Information     [employer] is an electronic gateway that provides easy, online access to your medical records. With [employer], you can request a clinic appointment, read your test results, renew a prescription or communicate with your care team.     To sign up for [email or web address] visit the website at [email or web address]   You will be asked to enter the access code listed below, as well as some personal information. Please follow the directions to create your username and password.     Your access code is: [email or web address]  Expires: 2019 11:48 AM     Your access code will  in 90 days. If you need help or a new code, please contact your [facility] or call  [phone] for assistance.        Care EveryWhere ID     This is your Care EveryWhere ID. This could be used by other organizations to access your [place] medical records  [id number]         Blood Pressure from Last 3 Encounters:   09/11/17 123/90   09/04/14 94/64   04/09/09 116/62    Weight from Last 3 Encounters:   09/11/17 55.3 kg (122 lb)   09/04/14 54.9 kg (121 lb)   04/27/04 56.7 kg (125 lb)              We Performed the Following     BOTOX 25-50 UNITS     CHEMODENERVATION, FACE NERVE MUSCLE        Primary Care Provider Office Phone # Fax #    Naz Toledo [phone]       RENEE KUHN [address]  [place] [address]        Equal Access to Services     [facility]: Hadii aad ku hadasho Soomaali, waaxda luqadaha, qaybta kaalmada adeegyada, waxdeb okeefe hayeric domínguez . So [facility] [phone].    ATENCIÓN: Si habla español, tiene a cosby disposición servicios gratuitos de asistencia lingüística. Surendra al [phone].    We comply with applicable federal civil rights laws and [place] laws. We do not discriminate on the basis of race, color, national origin, age, disability, sex, sexual orientation, or gender identity.            Thank you!     Thank you for choosing [facility]  for your care. Our goal is always to provide you with excellent care. Hearing back from our patients is one way we can continue to improve our services. Please take a few minutes to complete the written survey that you may receive in the mail after your visit with us. Thank you!             Your Updated Medication List - Protect others around you: Learn how to safely use, store and throw away your medicines at www.disposemymeds.org.      Notice  As of 11/15/2018 11:48 AM    You have not been prescribed any medications.

## 2018-11-15 NOTE — NURSING NOTE
Susanna Coto's goals for this visit include:   Chief Complaint   Patient presents with     RECHECK     Wants more information about throat/neck area       She requests these members of her care team be copied on today's visit information: yes      PCP: Naz Toledo    Referring Provider:  No referring provider defined for this encounter.    There were no vitals taken for this visit.    Ashwini Jacobo CMA ([facility])

## 2018-11-15 NOTE — LETTER
"    11/15/2018         RE: Susanna Coto  [address] [facility] [address]        Dear Colleague,    Thank you for referring your patient, Susanna Coto, to the [facility]. Please see a copy of my visit note below.    CLINICAL SUMMARY:   Diagnoses:  1) Facial paralysis, left, etiology acoustic neuroma excision.  7/15/2003: paralysis onset date.   8/2003: first sign of recovery.  Had physical therapy for facial paralysis in the past.  Had botox sessions in the past x2: really benefited from botox in the past without complications.   8/21/2014: platysmaectomy by Dr. Howard with large benefit of decreased banding and pain.       Comorbidities: None  Pertinent medications: Fish oil  Photographs: [facility] consents signed December 4, 2014.   Other: N/A   Care Checklist:   _Tanya Fernandes SLP for facial paralysis rehabilitation.   _Stop fish oil 2 weeks before botox injections.        Patient presents for botox injection.       \"After the last injection I felt like my face was pulling up. The botox did not wear off gradually this time, it stopped rapidly.\" Had itching/stiffness and mild pain with the weather change. There is associated tightness and discomfort with the synkinesis in her left face. She notices left facial numbness and pulling. I recommend treatment with Botox. She wants to skip the neck injection because she notices intermittent swallowing problems. She reports this has been evaluated at the [facility] previously without finding a cause.       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      Postoperative diagnosis: same.      Procedure:  Botox injection, 47u total to the following locations:  Left  5u x1  Left lateral superior orbit 10u x1  Left lateral mid orbit 5u x1  Left lateral inferior orbit 10u x1  Left chin 2.5u x3.  Right lower lip/chin (MESHA) 5u x1.  Right upper lip at maximal smile excursion 2u.          Botox informaton: " AllergKiwup Onabotulinumtoxin A 100u/vial. Lot#[id number] Expiration 4/2021, NDC 4434-2988-58.      A preoperative discussion was held. The Ms. Coto stated she understood the risks, benefits, alternatives, and limitations of the procedure. The risks, including but not limited to, bleeding, discomfort at the injection site, headache, neck pain, dry mouth, tiredness, loss of strength in the body, hoarseness, trouble saying words, trouble breathing, trouble swallowing, eye problems such as double vision, blurred vision, decreased eyesight, drooping eyelids, swelling of eyelids, dry eyes, and unforseen complications related to the procedure were discussed. I emphasized the possibility of worsening of her eye function given her history of facial paralysis and eye dryness. I also emphasized the temporary nature of the treatment and more treatment may be necessary in the future. She stated that her questions were answered to her satisfaction. She wished to undergo the procedure.      Procedure: After informed consent was obtained, the patient was prepped with an alcohol pad and marked. Reconstituted botox 100u with a 2mL dilution of unpreserved saline was used during the procedure.        47u total was injected at each of the following sites:    Left  5u x1  Left lateral superior orbit 10u x1  Left lateral mid orbit 5u x1  Left lateral inferior orbit 10u x1  Left chin 2.5u x3.  Right lower lip/chin (MESHA) 5u x1.  Right upper lip at maximal smile excursion 2u.  Hemostasis was excellent. The patient tolerated the procedure well and was discharged home in stable condition. She was instructed to avoid rubbing the injection site for at least 36 hours, keep her head elevated for 6 hours, minimize facial movement, and to call if they have questions or if problems arise.    Again, thank you for allowing me to participate in the care of your patient.        Sincerely,        Carlos Bhatt MD

## 2018-11-15 NOTE — PROGRESS NOTES
"CLINICAL SUMMARY:   Diagnoses:  1) Facial paralysis, left, etiology acoustic neuroma excision.  7/15/2003: paralysis onset date.   8/2003: first sign of recovery.  Had physical therapy for facial paralysis in the past.  Had botox sessions in the past x2: really benefited from botox in the past without complications.   8/21/2014: platysmaectomy by Dr. Howard with large benefit of decreased banding and pain.       Comorbidities: None  Pertinent medications: Fish oil  Photographs: [facility] consents signed December 4, 2014.   Other: N/A   Care Checklist:   _Tanya Fernandes SLP for facial paralysis rehabilitation.   _Stop fish oil 2 weeks before botox injections.        Patient presents for botox injection.       \"After the last injection I felt like my face was pulling up. The botox did not wear off gradually this time, it stopped rapidly.\" Had itching/stiffness and mild pain with the weather change. There is associated tightness and discomfort with the synkinesis in her left face. She notices left facial numbness and pulling. I recommend treatment with Botox. She wants to skip the neck injection because she notices intermittent swallowing problems. She reports this has been evaluated at the [facility] previously without finding a cause.       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      Postoperative diagnosis: same.      Procedure:  Botox injection, 47u total to the following locations:  Left  5u x1  Left lateral superior orbit 10u x1  Left lateral mid orbit 5u x1  Left lateral inferior orbit 10u x1  Left chin 2.5u x3.  Right lower lip/chin (MESHA) 5u x1.  Right upper lip at maximal smile excursion 2u.          Botox informaton: Allergan Onabotulinumtoxin A 100u/vial. Lot#[id number] Expiration 4/2021, NDC 7483-6964-92.      A preoperative discussion was held. The Ms. Coto stated she understood the risks, benefits, alternatives, and limitations of the procedure. The risks, including " but not limited to, bleeding, discomfort at the injection site, headache, neck pain, dry mouth, tiredness, loss of strength in the body, hoarseness, trouble saying words, trouble breathing, trouble swallowing, eye problems such as double vision, blurred vision, decreased eyesight, drooping eyelids, swelling of eyelids, dry eyes, and unforseen complications related to the procedure were discussed. I emphasized the possibility of worsening of her eye function given her history of facial paralysis and eye dryness. I also emphasized the temporary nature of the treatment and more treatment may be necessary in the future. She stated that her questions were answered to her satisfaction. She wished to undergo the procedure.      Procedure: After informed consent was obtained, the patient was prepped with an alcohol pad and marked. Reconstituted botox 100u with a 2mL dilution of unpreserved saline was used during the procedure.        47u total was injected at each of the following sites:    Left  5u x1  Left lateral superior orbit 10u x1  Left lateral mid orbit 5u x1  Left lateral inferior orbit 10u x1  Left chin 2.5u x3.  Right lower lip/chin (MESHA) 5u x1.  Right upper lip at maximal smile excursion 2u.  Hemostasis was excellent. The patient tolerated the procedure well and was discharged home in stable condition. She was instructed to avoid rubbing the injection site for at least 36 hours, keep her head elevated for 6 hours, minimize facial movement, and to call if they have questions or if problems arise.

## 2019-03-18 ENCOUNTER — TELEPHONE (OUTPATIENT)
Dept: OTOLARYNGOLOGY | Facility: CLINIC | Age: 59
End: 2019-03-18

## 2019-03-28 ENCOUNTER — OFFICE VISIT (OUTPATIENT)
Dept: OTOLARYNGOLOGY | Facility: CLINIC | Age: 59
End: 2019-03-28
Payer: COMMERCIAL

## 2019-03-28 DIAGNOSIS — G51.8 OTHER DISORDERS OF FACIAL NERVE: Primary | ICD-10-CM

## 2019-03-28 PROCEDURE — 64612 DESTROY NERVE FACE MUSCLE: CPT | Performed by: OTOLARYNGOLOGY

## 2019-03-28 NOTE — LETTER
"    3/28/2019         RE: Susanna Coto  [address] [facility] [address]        Dear Colleague,    Thank you for referring your patient, Susanna Coto, to the [facility]. Please see a copy of my visit note below.    CLINICAL SUMMARY:   Diagnoses:  1) Facial paralysis, left, etiology acoustic neuroma excision.  7/15/2003: paralysis onset date.   8/2003: first sign of recovery.  Had physical therapy for facial paralysis in the past.  Had botox sessions in the past x2: really benefited from botox in the past without complications.   8/21/2014: platysmaectomy by Dr. Howard with large benefit of decreased banding and pain.       Comorbidities: None  Pertinent medications: Fish oil  Photographs: [facility] consents signed December 4, 2014.   Other: N/A   Care Checklist:   _Tanya Fernandes SLP for facial paralysis rehabilitation.   _Stop fish oil 2 weeks before botox injections.           Patient presents for botox injection.       \"It has been getting worse over the last several weeks as the botox wears off. I notice the noises when I chew. The drinking out of a cup starts to get drooling. I notice more tightness and pulling of my cheek. A little food pocketing has started.\"  There is associated tightness and discomfort with the synkinesis in her left face. She notices left facial numbness and pulling. I recommend treatment with Botox. She wants to again skip the neck injections.      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      Postoperative diagnosis: same.      Procedure:  Botox injection, 47u total to the following locations:  Left  5u x1  Left lateral superior orbit 10u x1  Left lateral mid orbit 5u x1  Left lateral inferior orbit 10u x1  Left chin 2.5u x3.  Right lower lip/chin (MESHA) 5u x1.  Right upper lip at maximal smile excursion 2u.          Botox informaton: Allergan Onabotulinumtoxin A 100u/vial. Lot#[id number] Expiration 7/2021, NDC " 3105-7793-86.      A preoperative discussion was held. The Ms. Coto stated she understood the risks, benefits, alternatives, and limitations of the procedure. The risks, including but not limited to, bleeding, discomfort at the injection site, headache, neck pain, dry mouth, tiredness, loss of strength in the body, hoarseness, trouble saying words, trouble breathing, trouble swallowing, eye problems such as double vision, blurred vision, decreased eyesight, drooping eyelids, swelling of eyelids, dry eyes, and unforseen complications related to the procedure were discussed. I emphasized the possibility of worsening of her eye function given her history of facial paralysis and eye dryness. I also emphasized the temporary nature of the treatment and more treatment may be necessary in the future. She stated that her questions were answered to her satisfaction. She wished to undergo the procedure.      Procedure: After informed consent was obtained, the patient was prepped with an alcohol pad and marked. Reconstituted botox 100u with a 2mL dilution of unpreserved saline was used during the procedure.        47u total was injected at each of the following sites:    Left  5u x1  Left lateral superior orbit 10u x1  Left lateral mid orbit 5u x1  Left lateral inferior orbit 10u x1  Left chin 2.5u x3.  Right lower lip/chin (MESHA) 5u x1.  Right upper lip at maximal smile excursion 2u.  Hemostasis was excellent. The patient tolerated the procedure well and was discharged home in stable condition. She was instructed to avoid rubbing the injection site for at least 36 hours, keep her head elevated for 6 hours, minimize facial movement, and to call if they have questions or if problems arise.          Again, thank you for allowing me to participate in the care of your patient.        Sincerely,        Carlos Bhatt MD

## 2019-03-28 NOTE — PROGRESS NOTES
"CLINICAL SUMMARY:   Diagnoses:  1) Facial paralysis, left, etiology acoustic neuroma excision.  7/15/2003: paralysis onset date.   8/2003: first sign of recovery.  Had physical therapy for facial paralysis in the past.  Had botox sessions in the past x2: really benefited from botox in the past without complications.   8/21/2014: platysmaectomy by Dr. Howard with large benefit of decreased banding and pain.       Comorbidities: None  Pertinent medications: Fish oil  Photographs: [facility] consents signed December 4, 2014.   Other: N/A   Care Checklist:   _Tanya Fernandes SLP for facial paralysis rehabilitation.   _Stop fish oil 2 weeks before botox injections.           Patient presents for botox injection.       \"It has been getting worse over the last several weeks as the botox wears off. I notice the noises when I chew. The drinking out of a cup starts to get drooling. I notice more tightness and pulling of my cheek. A little food pocketing has started.\"  There is associated tightness and discomfort with the synkinesis in her left face. She notices left facial numbness and pulling. I recommend treatment with Botox. She wants to again skip the neck injections.      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      Postoperative diagnosis: same.      Procedure:  Botox injection, 47u total to the following locations:  Left  5u x1  Left lateral superior orbit 10u x1  Left lateral mid orbit 5u x1  Left lateral inferior orbit 10u x1  Left chin 2.5u x3.  Right lower lip/chin (MESHA) 5u x1.  Right upper lip at maximal smile excursion 2u.          Botox informaton: Allergan Onabotulinumtoxin A 100u/vial. Lot#[id number] Expiration 7/2021, NDC 7231-5400-22.      A preoperative discussion was held. The Ms. Coto stated she understood the risks, benefits, alternatives, and limitations of the procedure. The risks, including but not limited to, bleeding, discomfort at the injection site, headache, neck " pain, dry mouth, tiredness, loss of strength in the body, hoarseness, trouble saying words, trouble breathing, trouble swallowing, eye problems such as double vision, blurred vision, decreased eyesight, drooping eyelids, swelling of eyelids, dry eyes, and unforseen complications related to the procedure were discussed. I emphasized the possibility of worsening of her eye function given her history of facial paralysis and eye dryness. I also emphasized the temporary nature of the treatment and more treatment may be necessary in the future. She stated that her questions were answered to her satisfaction. She wished to undergo the procedure.      Procedure: After informed consent was obtained, the patient was prepped with an alcohol pad and marked. Reconstituted botox 100u with a 2mL dilution of unpreserved saline was used during the procedure.        47u total was injected at each of the following sites:    Left  5u x1  Left lateral superior orbit 10u x1  Left lateral mid orbit 5u x1  Left lateral inferior orbit 10u x1  Left chin 2.5u x3.  Right lower lip/chin (MESHA) 5u x1.  Right upper lip at maximal smile excursion 2u.  Hemostasis was excellent. The patient tolerated the procedure well and was discharged home in stable condition. She was instructed to avoid rubbing the injection site for at least 36 hours, keep her head elevated for 6 hours, minimize facial movement, and to call if they have questions or if problems arise.

## 2019-03-28 NOTE — NURSING NOTE
Susanna Coto's goals for this visit include:   Chief Complaint   Patient presents with     Botox       She requests these members of her care team be copied on today's visit information: Yes    PCP: Naz Toledo    Referring Provider:  No referring provider defined for this encounter.    There were no vitals taken for this visit.    Do you need any medication refills at today's visit? No

## 2019-10-23 NOTE — PROGRESS NOTES
"CLINICAL SUMMARY:   Diagnoses:  1) Facial paralysis, left, etiology acoustic neuroma excision.  7/15/2003: paralysis onset date.   8/2003: first sign of recovery.  Had physical therapy for facial paralysis in the past.  Had botox sessions in the past x2: really benefited from botox in the past without complications.   8/21/2014: platysmaectomy by Dr. Howard with large benefit of decreased banding and pain.       Comorbidities: None  Pertinent medications: Fish oil  Photographs: [facility] consents signed December 4, 2014.   Other: N/A   Care Checklist:   _Tanya Fernandes SLP for facial paralysis rehabilitation.   _Stop fish oil 2 weeks before botox injections.      Patient presents for botox injection.       \"I feel the tugging on my mouth and face. She has started to get drooling with thicker cups. My eye is shutting more when I talk and chew.\" There is associated tightness and discomfort with the synkinesis in her left face. She notices left facial numbness and pulling. I recommend treatment with Botox.       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      Postoperative diagnosis: same.      Procedure:  Botox injection, 47u total to the following locations:  Left  5u x1  Left lateral superior orbit 10u x1  Left lateral mid orbit 5u x1  Left lateral inferior orbit 10u x1  Left chin 2.5u x3.  Right lower lip/chin (MESHA) 5u x1.  Right upper lip at maximal smile excursion 2u.          Botox informaton: Allergan Onabotulinumtoxin A 100u/vial. Lot#[id number] Expiration 4/2022, NDC 0283-0372-52.      A preoperative discussion was held. The Ms. Coto stated she understood the risks, benefits, alternatives, and limitations of the procedure. The risks, including but not limited to, bleeding, discomfort at the injection site, headache, neck pain, dry mouth, tiredness, loss of strength in the body, hoarseness, trouble saying words, trouble breathing, trouble swallowing, eye problems such as double " vision, blurred vision, decreased eyesight, drooping eyelids, swelling of eyelids, dry eyes, and unforseen complications related to the procedure were discussed. I emphasized the possibility of worsening of her eye function given her history of facial paralysis and eye dryness. I also emphasized the temporary nature of the treatment and more treatment may be necessary in the future. She stated that her questions were answered to her satisfaction. She wished to undergo the procedure.      Procedure: After informed consent was obtained, the patient was prepped with an alcohol pad and marked. Reconstituted botox 100u with a 2mL dilution of unpreserved saline was used during the procedure.        47u total was injected at each of the following sites:    Left  5u x1  Left lateral superior orbit 10u x1  Left lateral mid orbit 5u x1  Left lateral inferior orbit 10u x1  Left chin 2.5u x3.  Right lower lip/chin (MESHA) 5u x1.  Right upper lip at maximal smile excursion 2u.  Hemostasis was excellent. The patient tolerated the procedure well and was discharged home in stable condition. She was instructed to avoid rubbing the injection site for at least 36 hours, keep her head elevated for 6 hours, minimize facial movement, and to call if they have questions or if problems arise.        She also reported problems swallowing. She ate brussel sprouts last night at 10pm and was regurgitating full green pieces from her throat at midnight and could still feel it in the morning. This reminded me of the history of Zenker's diverticulum. I asked her to see a swallowing specialist.

## 2019-10-24 ENCOUNTER — OFFICE VISIT (OUTPATIENT)
Dept: OTOLARYNGOLOGY | Facility: CLINIC | Age: 59
End: 2019-10-24
Payer: COMMERCIAL

## 2019-10-24 DIAGNOSIS — G51.9 FACIAL NERVE DISORDER: Primary | ICD-10-CM

## 2019-10-24 PROCEDURE — 64612 DESTROY NERVE FACE MUSCLE: CPT | Mod: LT | Performed by: OTOLARYNGOLOGY

## 2019-10-24 ASSESSMENT — PAIN SCALES - GENERAL: PAINLEVEL: NO PAIN (0)

## 2019-10-24 NOTE — PATIENT INSTRUCTIONS
Please see a swallowing specialist to consider the possibility of having a Zenker's diverticulum.

## 2019-10-24 NOTE — LETTER
"    10/24/2019         RE: Susanna Coto  [address] [facility] [address]        Dear Colleague,    Thank you for referring your patient, Susanna Coto, to the [facility]. Please see a copy of my visit note below.    CLINICAL SUMMARY:   Diagnoses:  1) Facial paralysis, left, etiology acoustic neuroma excision.  7/15/2003: paralysis onset date.   8/2003: first sign of recovery.  Had physical therapy for facial paralysis in the past.  Had botox sessions in the past x2: really benefited from botox in the past without complications.   8/21/2014: platysmaectomy by Dr. Howard with large benefit of decreased banding and pain.       Comorbidities: None  Pertinent medications: Fish oil  Photographs: [facility] consents signed December 4, 2014.   Other: N/A   Care Checklist:   _Tanya Fernandes SLP for facial paralysis rehabilitation.   _Stop fish oil 2 weeks before botox injections.      Patient presents for botox injection.       \"I feel the tugging on my mouth and face. She has started to get drooling with thicker cups. My eye is shutting more when I talk and chew.\" There is associated tightness and discomfort with the synkinesis in her left face. She notices left facial numbness and pulling. I recommend treatment with Botox.       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      Postoperative diagnosis: same.      Procedure:  Botox injection, 47u total to the following locations:  Left  5u x1  Left lateral superior orbit 10u x1  Left lateral mid orbit 5u x1  Left lateral inferior orbit 10u x1  Left chin 2.5u x3.  Right lower lip/chin (MESHA) 5u x1.  Right upper lip at maximal smile excursion 2u.          Botox informaton: Allergan Onabotulinumtoxin A 100u/vial. Lot#[id number] Expiration 4/2022, NDC 7849-9078-07.      A preoperative discussion was held. The Ms. Coto stated she understood the risks, benefits, alternatives, and limitations of the procedure. The " risks, including but not limited to, bleeding, discomfort at the injection site, headache, neck pain, dry mouth, tiredness, loss of strength in the body, hoarseness, trouble saying words, trouble breathing, trouble swallowing, eye problems such as double vision, blurred vision, decreased eyesight, drooping eyelids, swelling of eyelids, dry eyes, and unforseen complications related to the procedure were discussed. I emphasized the possibility of worsening of her eye function given her history of facial paralysis and eye dryness. I also emphasized the temporary nature of the treatment and more treatment may be necessary in the future. She stated that her questions were answered to her satisfaction. She wished to undergo the procedure.      Procedure: After informed consent was obtained, the patient was prepped with an alcohol pad and marked. Reconstituted botox 100u with a 2mL dilution of unpreserved saline was used during the procedure.        47u total was injected at each of the following sites:    Left  5u x1  Left lateral superior orbit 10u x1  Left lateral mid orbit 5u x1  Left lateral inferior orbit 10u x1  Left chin 2.5u x3.  Right lower lip/chin (MESHA) 5u x1.  Right upper lip at maximal smile excursion 2u.  Hemostasis was excellent. The patient tolerated the procedure well and was discharged home in stable condition. She was instructed to avoid rubbing the injection site for at least 36 hours, keep her head elevated for 6 hours, minimize facial movement, and to call if they have questions or if problems arise.        She also reported problems swallowing. She ate brussel sprouts last night at 10pm and was regurgitating full green pieces from her throat at midnight and could still feel it in the morning. This reminded me of the history of Zenker's diverticulum. I asked her to see a swallowing specialist.     Again, thank you for allowing me to participate in the care of your patient.         Sincerely,        Carlos Bhatt MD

## 2019-10-24 NOTE — NURSING NOTE
Susanna Coto's goals for this visit include:   Chief Complaint   Patient presents with     RECHECK     Botox       She requests these members of her care team be copied on today's visit information: Naz Toledo      PCP: Naz Toledo    Referring Provider:  No referring provider defined for this encounter.    There were no vitals taken for this visit.    Do you need any medication refills at today's visit? No    Dalia Mccullough RN

## 2020-02-03 ENCOUNTER — TELEPHONE (OUTPATIENT)
Dept: OTOLARYNGOLOGY | Facility: CLINIC | Age: 60
End: 2020-02-03

## 2020-02-03 NOTE — TELEPHONE ENCOUNTER
2/3 Explained we need to reschedule appointment on 3/19 with Dr. Bhatt. Provided phone number [phone] to reschedule.     Mirna Malloy   Procedure    Ortho/Sports Med/Ent/Eye   [facility]   [phone]

## 2020-03-23 ENCOUNTER — TELEPHONE (OUTPATIENT)
Dept: OTOLARYNGOLOGY | Facility: CLINIC | Age: 60
End: 2020-03-23

## 2020-03-23 NOTE — TELEPHONE ENCOUNTER
Reached out to patient to explain to her that we would have to cancel Thursday's appointment for Botox due to COVID-19 precautions after receiving direction from management. Discussed prior authorization will  in the upcoming days and we will begin the next year's authorization. Advised patient that we will reach out as soon as we know a date that we are able to do the Botox and patient said she will periodically check in with us as well.      Patsy Thompson LPN

## 2020-04-20 ENCOUNTER — TELEPHONE (OUTPATIENT)
Dept: OTOLARYNGOLOGY | Facility: CLINIC | Age: 60
End: 2020-04-20

## 2020-04-20 NOTE — TELEPHONE ENCOUNTER
[facility] 3 months.    Haritha Isbell, Surgery Scheduling Coordinator

## 2020-05-21 NOTE — PROGRESS NOTES
"CLINICAL SUMMARY:   Diagnoses:  1) Facial paralysis, left, etiology acoustic neuroma excision.  7/15/2003: paralysis onset date.   8/2003: first sign of recovery.  Had physical therapy for facial paralysis in the past.  Had botox sessions in the past x2: really benefited from botox in the past without complications.   8/21/2014: platysmaectomy by Dr. Howard with large benefit of decreased banding and pain.       Comorbidities: None  Pertinent medications: Fish oil  Photographs: [facility] consents signed December 4, 2014.   Other: N/A   Care Checklist:   _Tanya Fernandes SLP for facial paralysis rehabilitation.   _Stop fish oil 2 weeks before botox injections.      Patient presents for botox injection.       \"I started noticing that I could not drink well because of my facial movement. That is one of my indications that the botox is wearing off. The head pain has returned. That usually gets better with the botox. The botox helps a lot with that. I feel pulling on my paralyzed face. My cheek would pull up to my eye.\"There is associated tightness and discomfort with the synkinesis in her left face. She notices left facial numbness and pulling. I recommend treatment with Botox.       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      Postoperative diagnosis: same.      Procedure:  Botox injection, 47u total to the following locations:  Left  5u x1  Left lateral superior orbit 10u x1  Left lateral mid orbit 5u x1  Left lateral inferior orbit 10u x1  Left chin 2.5u x3.  Right lower lip/chin (MESHA) 5u x1.  Right upper lip at maximal smile excursion 2u.          Botox informaton: Allergan Onabotulinumtoxin A 100u/vial. Lot#[id number] Expiration 9/2022, NDC 8956-0010-43.      A preoperative discussion was held. The Ms. Coto stated she understood the risks, benefits, alternatives, and limitations of the procedure. The risks, including but not limited to, bleeding, discomfort at the injection site, " headache, neck pain, dry mouth, tiredness, loss of strength in the body, hoarseness, trouble saying words, trouble breathing, trouble swallowing, eye problems such as double vision, blurred vision, decreased eyesight, drooping eyelids, swelling of eyelids, dry eyes, and unforseen complications related to the procedure were discussed. I emphasized the possibility of worsening of her eye function given her history of facial paralysis and eye dryness. I also emphasized the temporary nature of the treatment and more treatment may be necessary in the future. She stated that her questions were answered to her satisfaction. She wished to undergo the procedure.      Procedure: After informed consent was obtained, the patient was prepped with an alcohol pad and marked. Reconstituted botox 100u with a 2mL dilution of unpreserved saline was used during the procedure.        47u total was injected at each of the following sites:    Left  5u x1  Left lateral superior orbit 10u x1  Left lateral mid orbit 5u x1  Left lateral inferior orbit 10u x1  Left chin 2.5u x3.  Right lower lip/chin (MESHA) 5u x1.  Right upper lip at maximal smile excursion 2u.  Hemostasis was excellent. The patient tolerated the procedure well and was discharged home in stable condition. She was instructed to avoid rubbing the injection site for at least 36 hours, keep her head elevated for 6 hours, minimize facial movement, and to call if they have questions or if problems arise.

## 2020-05-28 ENCOUNTER — OFFICE VISIT (OUTPATIENT)
Dept: OTOLARYNGOLOGY | Facility: CLINIC | Age: 60
End: 2020-05-28
Payer: COMMERCIAL

## 2020-05-28 DIAGNOSIS — G51.9 FACIAL NERVE DISORDER: Primary | ICD-10-CM

## 2020-05-28 PROCEDURE — 64612 DESTROY NERVE FACE MUSCLE: CPT | Mod: LT | Performed by: OTOLARYNGOLOGY

## 2020-05-28 ASSESSMENT — PAIN SCALES - GENERAL: PAINLEVEL: SEVERE PAIN (6)

## 2020-05-28 NOTE — LETTER
"    5/28/2020         RE: Susanna Coto  [address] [facility] [address]        Dear Colleague,    Thank you for referring your patient, Suasnna Coto, to the [facility]. Please see a copy of my visit note below.    CLINICAL SUMMARY:   Diagnoses:  1) Facial paralysis, left, etiology acoustic neuroma excision.  7/15/2003: paralysis onset date.   8/2003: first sign of recovery.  Had physical therapy for facial paralysis in the past.  Had botox sessions in the past x2: really benefited from botox in the past without complications.   8/21/2014: platysmaectomy by Dr. Howard with large benefit of decreased banding and pain.       Comorbidities: None  Pertinent medications: Fish oil  Photographs: [facility] consents signed December 4, 2014.   Other: N/A   Care Checklist:   _Tanya Fernandes SLP for facial paralysis rehabilitation.   _Stop fish oil 2 weeks before botox injections.      Patient presents for botox injection.       \"I started noticing that I could not drink well because of my facial movement. That is one of my indications that the botox is wearing off. The head pain has returned. That usually gets better with the botox. The botox helps a lot with that. I feel pulling on my paralyzed face. My cheek would pull up to my eye.\"There is associated tightness and discomfort with the synkinesis in her left face. She notices left facial numbness and pulling. I recommend treatment with Botox.       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      Postoperative diagnosis: same.      Procedure:  Botox injection, 47u total to the following locations:  Left  5u x1  Left lateral superior orbit 10u x1  Left lateral mid orbit 5u x1  Left lateral inferior orbit 10u x1  Left chin 2.5u x3.  Right lower lip/chin (MESHA) 5u x1.  Right upper lip at maximal smile excursion 2u.          Botox informaton: Allergan Onabotulinumtoxin A 100u/vial. Lot#[id number] Expiration " 9/2022, NDC 3076-2325-04.      A preoperative discussion was held. The Ms. Coto stated she understood the risks, benefits, alternatives, and limitations of the procedure. The risks, including but not limited to, bleeding, discomfort at the injection site, headache, neck pain, dry mouth, tiredness, loss of strength in the body, hoarseness, trouble saying words, trouble breathing, trouble swallowing, eye problems such as double vision, blurred vision, decreased eyesight, drooping eyelids, swelling of eyelids, dry eyes, and unforseen complications related to the procedure were discussed. I emphasized the possibility of worsening of her eye function given her history of facial paralysis and eye dryness. I also emphasized the temporary nature of the treatment and more treatment may be necessary in the future. She stated that her questions were answered to her satisfaction. She wished to undergo the procedure.      Procedure: After informed consent was obtained, the patient was prepped with an alcohol pad and marked. Reconstituted botox 100u with a 2mL dilution of unpreserved saline was used during the procedure.        47u total was injected at each of the following sites:    Left  5u x1  Left lateral superior orbit 10u x1  Left lateral mid orbit 5u x1  Left lateral inferior orbit 10u x1  Left chin 2.5u x3.  Right lower lip/chin (MESHA) 5u x1.  Right upper lip at maximal smile excursion 2u.  Hemostasis was excellent. The patient tolerated the procedure well and was discharged home in stable condition. She was instructed to avoid rubbing the injection site for at least 36 hours, keep her head elevated for 6 hours, minimize facial movement, and to call if they have questions or if problems arise.    Again, thank you for allowing me to participate in the care of your patient.        Sincerely,        Carlos hBatt MD

## 2020-05-28 NOTE — NURSING NOTE
Susanna Coto's goals for this visit include:   Chief Complaint   Patient presents with     Botox     facial botox        She requests these members of her care team be copied on today's visit information: Naz Toledo      PCP: Naz Toledo    Referring Provider:  No referring provider defined for this encounter.    There were no vitals taken for this visit.    Do you need any medication refills at today's visit? No    Dalia Mccullough RN

## 2020-11-19 ENCOUNTER — OFFICE VISIT (OUTPATIENT)
Dept: OTOLARYNGOLOGY | Facility: CLINIC | Age: 60
End: 2020-11-19
Payer: COMMERCIAL

## 2020-11-19 ENCOUNTER — TELEPHONE (OUTPATIENT)
Dept: OTOLARYNGOLOGY | Facility: CLINIC | Age: 60
End: 2020-11-19

## 2020-11-19 VITALS
SYSTOLIC BLOOD PRESSURE: 113 MMHG | HEART RATE: 73 BPM | OXYGEN SATURATION: 98 % | DIASTOLIC BLOOD PRESSURE: 78 MMHG | RESPIRATION RATE: 20 BRPM

## 2020-11-19 DIAGNOSIS — G51.8 OTHER DISORDERS OF FACIAL NERVE: ICD-10-CM

## 2020-11-19 DIAGNOSIS — G51.9 FACIAL NERVE DISORDER: Primary | ICD-10-CM

## 2020-11-19 PROCEDURE — 64612 DESTROY NERVE FACE MUSCLE: CPT | Mod: LT | Performed by: OTOLARYNGOLOGY

## 2020-11-19 RX ORDER — ESTRADIOL 0.1 MG/G
CREAM VAGINAL
COMMUNITY
Start: 2020-10-30

## 2020-11-19 RX ORDER — FLUTICASONE PROPIONATE 50 MCG
2 SPRAY, SUSPENSION (ML) NASAL DAILY
COMMUNITY

## 2020-11-19 ASSESSMENT — PAIN SCALES - GENERAL: PAINLEVEL: NO PAIN (0)

## 2020-11-19 NOTE — PROGRESS NOTES
"CLINICAL SUMMARY:   Diagnoses:  1) Facial paralysis, left, etiology acoustic neuroma excision.  7/15/2003: paralysis onset date.   8/2003: first sign of recovery.  Had physical therapy for facial paralysis in the past.  Had botox sessions in the past x2: really benefited from botox in the past without complications.   8/21/2014: platysmaectomy by Dr. Howard with large benefit of decreased banding and pain.       Comorbidities: None  Pertinent medications: Fish oil  Photographs: [facility] consents signed December 4, 2014.   Other: N/A   Care Checklist:   _Tanya Fernandes SLP for facial paralysis rehabilitation.   _Stop fish oil 2 weeks before botox injections.      Patient presents for botox injection.       Unfortunately Mrs. Coto was recently diagnosed with lymphoma and is in the process of staging her condition. She spoke with her oncologist Dr. Hutchison yesterday and Mrs. Coto asked about getting the botox injection today. Her oncologist said that now was a good time to get a botox injection before she starts treatment. We discussed how I have no knowledge about how this injection would impact any evaluation and treatment of her lymphoma and I would be willing to pause our injection plans for today to learn more. Mrs. Coto wanted to proceed.     \"The tightness of my left cheek near my eye has started to pull. It is more difficult to drink and chew.\" Those symptoms are relieved with botox. \"I can tell when the botox is wearing off and it has started to wear off. The botox also helps with my head pain.\"    There is associated tightness and discomfort with the synkinesis in her left face. She notices left facial numbness and pulling. I recommend treatment with Botox but she wanted to defer treatment on the right side to lower her botox dose.       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      Postoperative diagnosis: same.      Procedure:  Botox injection, 40u total to the following " locations:  Left  5u x1  Left lateral superior orbit 10u x1  Left lateral mid orbit 5u x1  Left lateral inferior orbit 10u x1  Left chin 2.5u x3.            Botox informaton: Allergan Onabotulinumtoxin A 100u/vial. Lot#[id number] Expiration 3/2023, NDC 0146-1655-00.      A preoperative discussion was held. The Ms. Coto stated she understood the risks, benefits, alternatives, and limitations of the procedure. The risks, including but not limited to, bleeding, discomfort at the injection site, headache, neck pain, dry mouth, tiredness, loss of strength in the body, hoarseness, trouble saying words, trouble breathing, trouble swallowing, eye problems such as double vision, blurred vision, decreased eyesight, drooping eyelids, swelling of eyelids, dry eyes, and unforseen complications related to the procedure were discussed. I emphasized the possibility of worsening of her eye function given her history of facial paralysis and eye dryness. I also emphasized the temporary nature of the treatment and more treatment may be necessary in the future. She stated that her questions were answered to her satisfaction. She wished to undergo the procedure.      Procedure: After informed consent was obtained, the patient was prepped with an alcohol pad and marked. Reconstituted botox 100u with a 2mL dilution of unpreserved saline was used during the procedure.        40u total was injected at each of the following sites:    Left  5u x1  Left lateral superior orbit 10u x1  Left lateral mid orbit 5u x1  Left lateral inferior orbit 10u x1  Left chin 2.5u x3.    Hemostasis was excellent. The patient tolerated the procedure well and was discharged home in stable condition. She was instructed to avoid rubbing the injection site for at least 36 hours, keep her head elevated for 6 hours, minimize facial movement, and to call if they have questions or if problems arise.  Carlos Bhatt MD

## 2020-11-19 NOTE — LETTER
"    11/19/2020         RE: Susanna Coto  [address] [address] [facility] [address]        Dear Colleague,    Thank you for referring your patient, Susanna Coto, to the [facility]. Please see a copy of my visit note below.    CLINICAL SUMMARY:   Diagnoses:  1) Facial paralysis, left, etiology acoustic neuroma excision.  7/15/2003: paralysis onset date.   8/2003: first sign of recovery.  Had physical therapy for facial paralysis in the past.  Had botox sessions in the past x2: really benefited from botox in the past without complications.   8/21/2014: platysmaectomy by Dr. Howard with large benefit of decreased banding and pain.       Comorbidities: None  Pertinent medications: Fish oil  Photographs: [facility] consents signed December 4, 2014.   Other: N/A   Care Checklist:   _Tanya Fernandes SLP for facial paralysis rehabilitation.   _Stop fish oil 2 weeks before botox injections.      Patient presents for botox injection.       Unfortunately Mrs. Coto was recently diagnosed with lymphoma and is in the process of staging her condition. She spoke with her oncologist Dr. Hutchison yesterday and Mrs. Coto asked about getting the botox injection today. Her oncologist said that now was a good time to get a botox injection before she starts treatment. We discussed how I have no knowledge about how this injection would impact any evaluation and treatment of her lymphoma and I would be willing to pause our injection plans for today to learn more. Mrs. Coto wanted to proceed.     \"The tightness of my left cheek near my eye has started to pull. It is more difficult to drink and chew.\" Those symptoms are relieved with botox. \"I can tell when the botox is wearing off and it has started to wear off. The botox also helps with my head pain.\"    There is associated tightness and discomfort with the synkinesis in her left face. She notices left facial numbness and pulling. I recommend treatment with " Botox but she wanted to defer treatment on the right side to lower her botox dose.       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      Postoperative diagnosis: same.      Procedure:  Botox injection, 40u total to the following locations:  Left  5u x1  Left lateral superior orbit 10u x1  Left lateral mid orbit 5u x1  Left lateral inferior orbit 10u x1  Left chin 2.5u x3.            Botox informaton: [place] Onabotulinumtoxin A 100u/vial. Lot#[id number] Expiration 3/2023, NDC 9736-4771-71.      A preoperative discussion was held. The Ms. Coto stated she understood the risks, benefits, alternatives, and limitations of the procedure. The risks, including but not limited to, bleeding, discomfort at the injection site, headache, neck pain, dry mouth, tiredness, loss of strength in the body, hoarseness, trouble saying words, trouble breathing, trouble swallowing, eye problems such as double vision, blurred vision, decreased eyesight, drooping eyelids, swelling of eyelids, dry eyes, and unforseen complications related to the procedure were discussed. I emphasized the possibility of worsening of her eye function given her history of facial paralysis and eye dryness. I also emphasized the temporary nature of the treatment and more treatment may be necessary in the future. She stated that her questions were answered to her satisfaction. She wished to undergo the procedure.      Procedure: After informed consent was obtained, the patient was prepped with an alcohol pad and marked. Reconstituted botox 100u with a 2mL dilution of unpreserved saline was used during the procedure.        40u total was injected at each of the following sites:    Left  5u x1  Left lateral superior orbit 10u x1  Left lateral mid orbit 5u x1  Left lateral inferior orbit 10u x1  Left chin 2.5u x3.    Hemostasis was excellent. The patient tolerated the procedure well and was discharged home in stable  condition. She was instructed to avoid rubbing the injection site for at least 36 hours, keep her head elevated for 6 hours, minimize facial movement, and to call if they have questions or if problems arise.  Carlos Bhatt MD        Again, thank you for allowing me to participate in the care of your patient.        Sincerely,        Carlos Bhatt MD

## 2020-11-19 NOTE — NURSING NOTE
Susanna Coto's goals for this visit include:   Chief Complaint   Patient presents with     Botox     for facial paralysis/synkinesis     She requests these members of her care team be copied on today's visit information:     PCP: Naz Toledo    Referring Provider:  No referring provider defined for this encounter.    /78 (BP Location: Left arm, Patient Position: Sitting, Cuff Size: Adult Regular)   Pulse 73   Resp 20   SpO2 98%     Do you need any medication refills at today's visit? No    Cecilia Bright LPN

## 2020-11-19 NOTE — TELEPHONE ENCOUNTER
"Phone call received from pt stating she has just been diagnosed with follicular lymphoma.  She met with oncologist Dr Dyan Hutchison, who told pt that she is ok to receive facial botox treatment but that she should have it done \"sooner than later\".  Phone call to Dr Hutchison's nurse who confirmed that Dr Hutchison is aware of botox treatments and that pt is still in \"workup stage\" and that no contraindication to facial botox treatment was noted.  Pt scheduled with Dr Bhatt 11/19/20. Dalia Mccullough RN  "

## 2021-03-01 ENCOUNTER — TELEPHONE (OUTPATIENT)
Dept: OTOLARYNGOLOGY | Facility: CLINIC | Age: 61
End: 2021-03-01

## 2021-03-01 NOTE — TELEPHONE ENCOUNTER
[facility] Call Center    Phone Message    May a detailed message be left on voicemail: no     Reason for Call: Other: Pt asking for a call back.  She needs her 6 month botox injections but unable to make 3-18-21.  She is asking if she can talk to Dalia and get in on the 4th.  Please advise.      Action Taken: Message routed to:  Adult Clinics: ENT p [id number]    Travel Screening:

## 2021-03-25 ENCOUNTER — OFFICE VISIT (OUTPATIENT)
Dept: OTOLARYNGOLOGY | Facility: CLINIC | Age: 61
End: 2021-03-25
Payer: COMMERCIAL

## 2021-03-25 VITALS — HEART RATE: 70 BPM | DIASTOLIC BLOOD PRESSURE: 79 MMHG | OXYGEN SATURATION: 97 % | SYSTOLIC BLOOD PRESSURE: 121 MMHG

## 2021-03-25 DIAGNOSIS — G51.9 FACIAL NERVE DISORDER: Primary | ICD-10-CM

## 2021-03-25 DIAGNOSIS — G51.8 OTHER DISORDERS OF FACIAL NERVE: ICD-10-CM

## 2021-03-25 PROCEDURE — 99207 PR NON-BILLABLE SERV PER CHARTING: CPT | Performed by: OTOLARYNGOLOGY

## 2021-03-25 NOTE — NURSING NOTE
Susanna Coto's goals for this visit include:   Chief Complaint   Patient presents with     Procedure     Facial paralysis, left, etiology acoustic neuroma excision.       She requests these members of her care team be copied on today's visit information: none    PCP: Naz Toledo    Referring Provider:  No referring provider defined for this encounter.    /79   Pulse 70   SpO2 97%     Do you need any medication refills at today's visit? none

## 2021-03-25 NOTE — PROGRESS NOTES
We are deferring botox treatment today because Mrs. Coto is in the middle of her COVID vaccinations. We will defer for at least 6 weeks after her 2nd vaccination. No service performed today. Carlos Bhatt MD

## 2021-03-25 NOTE — LETTER
3/25/2021         RE: Susanna Coto  [address] [address] [facility] [address]        Dear Colleague,    Thank you for referring your patient, Susanna Coto, to the [facility]. Please see a copy of my visit note below.    We are deferring botox treatment today because Mrs. Coto is in the middle of her COVID vaccinations. We will defer for at least 6 weeks after her 2nd vaccination. No service performed today. Carlos Bhatt MD        Again, thank you for allowing me to participate in the care of your patient.        Sincerely,        Carlos Bhatt MD

## 2021-05-12 DIAGNOSIS — G51.9 FACIAL NERVE DISORDER: Primary | ICD-10-CM

## 2021-05-28 ENCOUNTER — RECORDS - HEALTHEAST (OUTPATIENT)
Dept: ADMINISTRATIVE | Facility: CLINIC | Age: 61
End: 2021-05-28

## 2021-06-03 ENCOUNTER — OFFICE VISIT (OUTPATIENT)
Dept: OTOLARYNGOLOGY | Facility: CLINIC | Age: 61
End: 2021-06-03
Payer: COMMERCIAL

## 2021-06-03 VITALS — HEART RATE: 85 BPM | OXYGEN SATURATION: 97 % | DIASTOLIC BLOOD PRESSURE: 82 MMHG | SYSTOLIC BLOOD PRESSURE: 120 MMHG

## 2021-06-03 DIAGNOSIS — G51.9 FACIAL NERVE DISORDER: Primary | ICD-10-CM

## 2021-06-03 PROCEDURE — 64612 DESTROY NERVE FACE MUSCLE: CPT | Mod: LT | Performed by: OTOLARYNGOLOGY

## 2021-06-03 NOTE — NURSING NOTE
Susanna Coto's goals for this visit include:   Chief Complaint   Patient presents with     Follow Up     Botox injection       She requests these members of her care team be copied on today's visit information: yes    PCP: Naz Toledo    Referring Provider:  No referring provider defined for this encounter.    /82   Pulse 85   SpO2 97%     Do you need any medication refills at today's visit? none

## 2021-06-03 NOTE — LETTER
"    6/3/2021         RE: Susanna Coto  [address] [address] [address]        Dear Colleague,    Thank you for referring your patient, Susanna Coto, to the [facility]. Please see a copy of my visit note below.    CLINICAL SUMMARY:   Diagnoses:  1) Facial paralysis, left, etiology acoustic neuroma excision.  7/15/2003: paralysis onset date.   8/2003: first sign of recovery.  Had physical therapy for facial paralysis in the past.  Had botox sessions in the past x2: really benefited from botox in the past without complications.   8/21/2014: platysmaectomy by Dr. Howard with large benefit of decreased banding and pain.       Comorbidities: None  Pertinent medications: Fish oil  Photographs: [facility] consents signed December 4, 2014.   Other: N/A   Care Checklist:   _Tanya Fernandes SLP for facial paralysis rehabilitation.   _Stop fish oil 2 weeks before botox injections.        Patient presents for botox injection.       No problems after last botox injection.      \"Drinking is an issue and biting my lip. Talking feels really tight.\" Those symptoms are relieved with botox. She has noticed the unwanted eye closure on the left side with eating and talking.      There is associated tightness and discomfort with the synkinesis in her left face. She wanted to restart the right sided balancing injections and also inject her left lower lip because of the unwanted lip movement with blinking.       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      Postoperative diagnosis: same.      Procedure:  Botox injection, 52u total to the following locations:  Left  5u x1  Left lateral superior orbit 10u x1  Left lateral mid orbit 5u x1  Left lateral inferior orbit 10u x1  Left chin 2.5u x3.  Left lower lip/chin (MESHA) 5u x1.  Right lower lip/chin (MESHA) 5u x1.  Right upper lip at maximal smile excursion 2u.           Botox informaton: [employer]john Onabotulinumtoxin A " 100u/vial. Lot#[id number] Expiration 12/2023, NDC 7715-5608-66.      A preoperative discussion was held. The Ms. Coto stated she understood the risks, benefits, alternatives, and limitations of the procedure. The risks, including but not limited to, bleeding, discomfort at the injection site, headache, neck pain, dry mouth, tiredness, loss of strength in the body, hoarseness, trouble saying words, trouble breathing, trouble swallowing, eye problems such as double vision, blurred vision, decreased eyesight, drooping eyelids, swelling of eyelids, dry eyes, and unforseen complications related to the procedure were discussed. I emphasized the possibility of worsening of her eye function given her history of facial paralysis and eye dryness. I also emphasized the temporary nature of the treatment and more treatment may be necessary in the future. She stated that her questions were answered to her satisfaction. She wished to undergo the procedure.      Procedure: After informed consent was obtained, the patient was prepped with an alcohol pad and marked. Reconstituted botox 100u with a 2mL dilution of unpreserved saline was used during the procedure.        40u total was injected at each of the following sites:    Left  5u x1  Left lateral superior orbit 10u x1  Left lateral mid orbit 5u x1  Left lateral inferior orbit 10u x1  Left chin 2.5u x3.  Left lower lip/chin (MESHA) 5u x1.  Right lower lip/chin (MESHA) 5u x1.  Right upper lip at maximal smile excursion 2u.     Hemostasis was excellent. The patient tolerated the procedure well and was discharged home in stable condition. She was instructed to avoid rubbing the injection site for at least 36 hours, keep her head elevated for 6 hours, minimize facial movement, and to call if they have questions or if problems arise.  Carlos Bhatt MD      Again, thank you for allowing me to participate in the care of your patient.        Sincerely,        Carlos Bhatt,  MD

## 2021-06-03 NOTE — PROGRESS NOTES
Phone call received from pt stating today's botox injection has not yet been authorized by BC/BS.  Initial PA was submitted by [employer] however further information was requested by BC/BS and was not received.  Phone call to BC/BS prior authorization dept.  Provided diagnosis code G51.8, PA approved 6/321-6/2/22. Case [id number].  Dalia Mccullough RN

## 2021-06-03 NOTE — PROGRESS NOTES
"CLINICAL SUMMARY:   Diagnoses:  1) Facial paralysis, left, etiology acoustic neuroma excision.  7/15/2003: paralysis onset date.   8/2003: first sign of recovery.  Had physical therapy for facial paralysis in the past.  Had botox sessions in the past x2: really benefited from botox in the past without complications.   8/21/2014: platysmaectomy by Dr. Howard with large benefit of decreased banding and pain.       Comorbidities: None  Pertinent medications: Fish oil  Photographs: [facility] consents signed December 4, 2014.   Other: N/A   Care Checklist:   _Tanya Fernandes SLP for facial paralysis rehabilitation.   _Stop fish oil 2 weeks before botox injections.        Patient presents for botox injection.       No problems after last botox injection.      \"Drinking is an issue and biting my lip. Talking feels really tight.\" Those symptoms are relieved with botox. She has noticed the unwanted eye closure on the left side with eating and talking.      There is associated tightness and discomfort with the synkinesis in her left face. She wanted to restart the right sided balancing injections and also inject her left lower lip because of the unwanted lip movement with blinking.       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      Postoperative diagnosis: same.      Procedure:  Botox injection, 52u total to the following locations:  Left  5u x1  Left lateral superior orbit 10u x1  Left lateral mid orbit 5u x1  Left lateral inferior orbit 10u x1  Left chin 2.5u x3.  Left lower lip/chin (MESHA) 5u x1.  Right lower lip/chin (MESHA) 5u x1.  Right upper lip at maximal smile excursion 2u.           Botox informaton: Allergan Onabotulinumtoxin A 100u/vial. Lot#[id number] Expiration 12/2023, NDC 2859-7800-06.      A preoperative discussion was held. The Ms. Coto stated she understood the risks, benefits, alternatives, and limitations of the procedure. The risks, including but not limited to, bleeding, " discomfort at the injection site, headache, neck pain, dry mouth, tiredness, loss of strength in the body, hoarseness, trouble saying words, trouble breathing, trouble swallowing, eye problems such as double vision, blurred vision, decreased eyesight, drooping eyelids, swelling of eyelids, dry eyes, and unforseen complications related to the procedure were discussed. I emphasized the possibility of worsening of her eye function given her history of facial paralysis and eye dryness. I also emphasized the temporary nature of the treatment and more treatment may be necessary in the future. She stated that her questions were answered to her satisfaction. She wished to undergo the procedure.      Procedure: After informed consent was obtained, the patient was prepped with an alcohol pad and marked. Reconstituted botox 100u with a 2mL dilution of unpreserved saline was used during the procedure.        40u total was injected at each of the following sites:    Left  5u x1  Left lateral superior orbit 10u x1  Left lateral mid orbit 5u x1  Left lateral inferior orbit 10u x1  Left chin 2.5u x3.  Left lower lip/chin (MESHA) 5u x1.  Right lower lip/chin (MESHA) 5u x1.  Right upper lip at maximal smile excursion 2u.     Hemostasis was excellent. The patient tolerated the procedure well and was discharged home in stable condition. She was instructed to avoid rubbing the injection site for at least 36 hours, keep her head elevated for 6 hours, minimize facial movement, and to call if they have questions or if problems arise.  Carlos Bhatt MD

## 2022-03-18 NOTE — PROGRESS NOTES
"CLINICAL SUMMARY:   Diagnoses:  1) Facial paralysis, left, etiology acoustic neuroma excision.  7/15/2003: paralysis onset date.   8/2003: first sign of recovery.  Had physical therapy for facial paralysis in the past.  Had botox sessions in the past x2: really benefited from botox in the past without complications.   8/21/2014: platysmaectomy by Dr. Howard with large benefit of decreased banding and pain.       Comorbidities: None  Pertinent medications: Fish oil  Photographs: [facility] consents signed December 4, 2014.   Other: N/A   Care Checklist:   _Tanya Fernandes SLP for facial paralysis rehabilitation.   _Stop fish oil 2 weeks before botox injections.          Patient presents for botox injection.       No problems after last botox injection.      \"I can feel the pain in my head because of the muscle tightness.\" Hard to drink due to the tensenes and pulling of my facial muscles. She has unwanted eye closure on the left side with eating and talking. It is worse when she is tired.            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      Postoperative diagnosis: same.      Procedure:  Botox injection, 52u total to the following locations:  Left  5u x1  Left lateral superior orbit 10u x1  Left lateral mid orbit 5u x1  Left lateral inferior orbit 10u x1  Left chin 2.5u x3.  Left lower lip/chin (MESHA) 5u x1.  Right lower lip/chin (MESHA) 5u x1.  Right upper lip at maximal smile excursion 2u.            Botox informaton: Allergan Onabotulinumtoxin A 100u/vial. Lot#[id number] Expiration 4/2024, NDC 1064-9625-88.      A preoperative discussion was held. The Ms. Coto stated she understood the risks, benefits, alternatives, and limitations of the procedure. The risks, including but not limited to, bleeding, discomfort at the injection site, headache, neck pain, dry mouth, tiredness, loss of strength in the body, hoarseness, trouble saying words, trouble breathing, trouble swallowing, eye " problems such as double vision, blurred vision, decreased eyesight, drooping eyelids, swelling of eyelids, dry eyes, and unforseen complications related to the procedure were discussed. I emphasized the possibility of worsening of her eye function given her history of facial paralysis and eye dryness. I also emphasized the temporary nature of the treatment and more treatment may be necessary in the future. She stated that her questions were answered to her satisfaction. She wished to undergo the procedure.      Procedure: After informed consent was obtained, the patient was prepped with an alcohol pad and marked. Reconstituted botox 100u with a 2mL dilution of unpreserved saline was used during the procedure.        40u total was injected at each of the following sites:    Left  5u x1  Left lateral superior orbit 10u x1  Left lateral mid orbit 5u x1  Left lateral inferior orbit 10u x1  Left chin 2.5u x3.  Left lower lip/chin (MESHA) 5u x1.  Right lower lip/chin (MESHA) 5u x1.  Right upper lip at maximal smile excursion 2u.     Hemostasis was excellent. The patient tolerated the procedure well and was discharged home in stable condition. She was instructed to avoid rubbing the injection site for at least 36 hours, keep her head elevated for 6 hours, minimize facial movement, and to call if they have questions or if problems arise.  Carlos Bhatt MD

## 2022-03-24 ENCOUNTER — OFFICE VISIT (OUTPATIENT)
Dept: OTOLARYNGOLOGY | Facility: CLINIC | Age: 62
End: 2022-03-24
Payer: COMMERCIAL

## 2022-03-24 ENCOUNTER — TELEPHONE (OUTPATIENT)
Dept: OTOLARYNGOLOGY | Facility: CLINIC | Age: 62
End: 2022-03-24

## 2022-03-24 VITALS — HEART RATE: 66 BPM | SYSTOLIC BLOOD PRESSURE: 116 MMHG | OXYGEN SATURATION: 98 % | DIASTOLIC BLOOD PRESSURE: 75 MMHG

## 2022-03-24 DIAGNOSIS — G51.8 OTHER DISORDERS OF FACIAL NERVE: Primary | ICD-10-CM

## 2022-03-24 PROCEDURE — 64612 DESTROY NERVE FACE MUSCLE: CPT | Mod: 50 | Performed by: OTOLARYNGOLOGY

## 2022-03-24 PROCEDURE — 99207 PR NO CHARGE LOS: CPT | Performed by: OTOLARYNGOLOGY

## 2022-03-24 ASSESSMENT — PAIN SCALES - GENERAL: PAINLEVEL: MODERATE PAIN (4)

## 2022-03-24 NOTE — TELEPHONE ENCOUNTER
Good Afternoon,     Writer spoke with the patient insurance company and they would like to know how many visits does the patient need within a 6 month time frame for the  - [facility] BOTULINUM TOXIN A PER 1 UNIT injection? Please advise Thank you.      Angelika Lozano    Financial Counselor  [address] [address] [place] [id number]  Phone- [phone]  Fax- [phone]

## 2022-03-24 NOTE — LETTER
"    3/24/2022         RE: Susanna Coto  [address] [facility] [address]        Dear Colleague,    Thank you for referring your patient, Susanna Coto, to the [facility]. Please see a copy of my visit note below.    CLINICAL SUMMARY:   Diagnoses:  1) Facial paralysis, left, etiology acoustic neuroma excision.  7/15/2003: paralysis onset date.   8/2003: first sign of recovery.  Had physical therapy for facial paralysis in the past.  Had botox sessions in the past x2: really benefited from botox in the past without complications.   8/21/2014: platysmaectomy by Dr. Howard with large benefit of decreased banding and pain.       Comorbidities: None  Pertinent medications: Fish oil  Photographs: [facility] consents signed December 4, 2014.   Other: N/A   Care Checklist:   _Tanya Fernandes SLP for facial paralysis rehabilitation.   _Stop fish oil 2 weeks before botox injections.          Patient presents for botox injection.       No problems after last botox injection.      \"I can feel the pain in my head because of the muscle tightness.\" Hard to drink due to the tensenes and pulling of my facial muscles. She has unwanted eye closure on the left side with eating and talking. It is worse when she is tired.            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      Postoperative diagnosis: same.      Procedure:  Botox injection, 52u total to the following locations:  Left  5u x1  Left lateral superior orbit 10u x1  Left lateral mid orbit 5u x1  Left lateral inferior orbit 10u x1  Left chin 2.5u x3.  Left lower lip/chin (MESHA) 5u x1.  Right lower lip/chin (MESHA) 5u x1.  Right upper lip at maximal smile excursion 2u.            Botox informaton: Allergan Onabotulinumtoxin A 100u/vial. Lot#[id number] Expiration 4/2024, NDC 5273-2284-76.      A preoperative discussion was held. The Ms. Coto stated she understood the risks, benefits, alternatives, and " limitations of the procedure. The risks, including but not limited to, bleeding, discomfort at the injection site, headache, neck pain, dry mouth, tiredness, loss of strength in the body, hoarseness, trouble saying words, trouble breathing, trouble swallowing, eye problems such as double vision, blurred vision, decreased eyesight, drooping eyelids, swelling of eyelids, dry eyes, and unforseen complications related to the procedure were discussed. I emphasized the possibility of worsening of her eye function given her history of facial paralysis and eye dryness. I also emphasized the temporary nature of the treatment and more treatment may be necessary in the future. She stated that her questions were answered to her satisfaction. She wished to undergo the procedure.      Procedure: After informed consent was obtained, the patient was prepped with an alcohol pad and marked. Reconstituted botox 100u with a 2mL dilution of unpreserved saline was used during the procedure.        40u total was injected at each of the following sites:    Left  5u x1  Left lateral superior orbit 10u x1  Left lateral mid orbit 5u x1  Left lateral inferior orbit 10u x1  Left chin 2.5u x3.  Left lower lip/chin (MESHA) 5u x1.  Right lower lip/chin (MESHA) 5u x1.  Right upper lip at maximal smile excursion 2u.     Hemostasis was excellent. The patient tolerated the procedure well and was discharged home in stable condition. She was instructed to avoid rubbing the injection site for at least 36 hours, keep her head elevated for 6 hours, minimize facial movement, and to call if they have questions or if problems arise.  Carlos Bhatt MD         Again, thank you for allowing me to participate in the care of your patient.        Sincerely,        Carlos Bhatt MD

## 2022-10-17 DIAGNOSIS — G51.8 OTHER DISORDERS OF FACIAL NERVE: Primary | ICD-10-CM

## 2022-10-24 NOTE — PROGRESS NOTES
"CLINICAL SUMMARY:   Diagnoses:  1) Facial paralysis, left, etiology acoustic neuroma excision.  7/15/2003: paralysis onset date.   8/2003: first sign of recovery.  Had physical therapy for facial paralysis in the past.  Had botox sessions in the past x2: really benefited from botox in the past without complications.   8/21/2014: platysmaectomy by Dr. Howard with large benefit of decreased banding and pain.       Comorbidities: None  Pertinent medications: Fish oil  Photographs: [facility] consents signed December 4, 2014.   Other: N/A   Care Checklist:   _RTC as needed.          Patient presents for botox injection.       No problems after last botox injection.     \"Everything with my face was great until about 1 month ago when moisture started coming out of my mouth and food coming out. My face drooping more. More pain and tightness of the left face. I am having troubles drinking out of a cup and liquids spilling out.\" Windy environments are harder when outside because her eye gets dry.     She has unwanted eye closure on the left side with eating and talking.            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      Postoperative diagnosis: same.      Procedure:  Botox injection, 49.5u total to the following locations:  Left  5u x1  Left lateral superior orbit 10u x1  Left lateral mid orbit 5u x1  Left lateral inferior orbit 10u x1  Left chin 2.5u x3.  Left lower lip/chin (MESHA) 5u x1.  Right lower lip/chin (MESHA) 5u x1.  Right upper lip at maximal smile excursion 2u.            Botox informaton: Allergan Onabotulinumtoxin A 100u/vial. Lot#[id number] Expiration 11/2024, NDC 6479-2812-36.      A preoperative discussion was held. The Ms. Coto stated she understood the risks, benefits, alternatives, and limitations of the procedure. The risks, including but not limited to, bleeding, discomfort at the injection site, headache, neck pain, dry mouth, tiredness, loss of strength in the " body, hoarseness, trouble saying words, trouble breathing, trouble swallowing, eye problems such as double vision, blurred vision, decreased eyesight, drooping eyelids, swelling of eyelids, dry eyes, and unforseen complications related to the procedure were discussed. I emphasized the possibility of worsening of her eye function given her history of facial paralysis and eye dryness. I also emphasized the temporary nature of the treatment and more treatment may be necessary in the future. She stated that her questions were answered to her satisfaction. She wished to undergo the procedure.      Procedure: After informed consent was obtained, the patient was prepped with an alcohol pad and marked. Reconstituted botox 100u with a 2mL dilution of unpreserved saline was used during the procedure.        49.5u total was injected at each of the following sites:    Left  5u x1  Left lateral superior orbit 10u x1  Left lateral mid orbit 5u x1  Left lateral inferior orbit 10u x1  Left chin 2.5u x3.  Left lower lip/chin (MESHA) 5u x1.  Right lower lip/chin (MESHA) 5u x1.  Right upper lip at maximal smile excursion 2u.     Hemostasis was excellent. The patient tolerated the procedure well and was discharged home in stable condition. She was instructed to avoid rubbing the injection site for at least 36 hours, keep her head elevated for 6 hours, minimize facial movement, and to call if they have questions or if problems arise.  Carlos Bhatt MD

## 2022-11-03 ENCOUNTER — OFFICE VISIT (OUTPATIENT)
Dept: OTOLARYNGOLOGY | Facility: CLINIC | Age: 62
End: 2022-11-03
Payer: COMMERCIAL

## 2022-11-03 VITALS — HEART RATE: 73 BPM | DIASTOLIC BLOOD PRESSURE: 73 MMHG | SYSTOLIC BLOOD PRESSURE: 106 MMHG

## 2022-11-03 DIAGNOSIS — G51.8 OTHER DISORDERS OF FACIAL NERVE: Primary | ICD-10-CM

## 2022-11-03 PROCEDURE — 64612 DESTROY NERVE FACE MUSCLE: CPT | Mod: 50 | Performed by: OTOLARYNGOLOGY

## 2022-11-03 PROCEDURE — 99207 PR NO CHARGE LOS: CPT | Performed by: OTOLARYNGOLOGY

## 2022-11-03 NOTE — NURSING NOTE
Susanna Coto's chief complaint for this visit includes:  Chief Complaint   Patient presents with     Follow Up     Botox      PCP: Naz Toledo    Referring Provider:  No referring provider defined for this encounter.    /73   Pulse 73   Data Unavailable        Allergies   Allergen Reactions     Penicillins      Penicillins Unknown     Clindamycin Rash     Latex Itching and Rash         Do you need any medication refills at today's visit?

## 2022-11-03 NOTE — LETTER
"    11/3/2022         RE: Susanna Coto  [address] [facility] [address]        Dear Colleague,    Thank you for referring your patient, Susanna Coto, to the [facility]. Please see a copy of my visit note below.    CLINICAL SUMMARY:   Diagnoses:  1) Facial paralysis, left, etiology acoustic neuroma excision.  7/15/2003: paralysis onset date.   8/2003: first sign of recovery.  Had physical therapy for facial paralysis in the past.  Had botox sessions in the past x2: really benefited from botox in the past without complications.   8/21/2014: platysmaectomy by Dr. Howard with large benefit of decreased banding and pain.       Comorbidities: None  Pertinent medications: Fish oil  Photographs: [facility] consents signed December 4, 2014.   Other: N/A   Care Checklist:   _RTC as needed.          Patient presents for botox injection.       No problems after last botox injection.     \"Everything with my face was great until about 1 month ago when moisture started coming out of my mouth and food coming out. My face drooping more. More pain and tightness of the left face. I am having troubles drinking out of a cup and liquids spilling out.\" Windy environments are harder when outside because her eye gets dry.     She has unwanted eye closure on the left side with eating and talking.            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      Postoperative diagnosis: same.      Procedure:  Botox injection, 49.5u total to the following locations:  Left  5u x1  Left lateral superior orbit 10u x1  Left lateral mid orbit 5u x1  Left lateral inferior orbit 10u x1  Left chin 2.5u x3.  Left lower lip/chin (MESHA) 5u x1.  Right lower lip/chin (MESHA) 5u x1.  Right upper lip at maximal smile excursion 2u.            Botox informaton: Allergan Onabotulinumtoxin A 100u/vial. Lot#[id number] Expiration 11/2024, NDC 9119-0618-71.      A preoperative discussion was " held. The Ms. Coto stated she understood the risks, benefits, alternatives, and limitations of the procedure. The risks, including but not limited to, bleeding, discomfort at the injection site, headache, neck pain, dry mouth, tiredness, loss of strength in the body, hoarseness, trouble saying words, trouble breathing, trouble swallowing, eye problems such as double vision, blurred vision, decreased eyesight, drooping eyelids, swelling of eyelids, dry eyes, and unforseen complications related to the procedure were discussed. I emphasized the possibility of worsening of her eye function given her history of facial paralysis and eye dryness. I also emphasized the temporary nature of the treatment and more treatment may be necessary in the future. She stated that her questions were answered to her satisfaction. She wished to undergo the procedure.      Procedure: After informed consent was obtained, the patient was prepped with an alcohol pad and marked. Reconstituted botox 100u with a 2mL dilution of unpreserved saline was used during the procedure.        49.5u total was injected at each of the following sites:    Left  5u x1  Left lateral superior orbit 10u x1  Left lateral mid orbit 5u x1  Left lateral inferior orbit 10u x1  Left chin 2.5u x3.  Left lower lip/chin (MESHA) 5u x1.  Right lower lip/chin (MESHA) 5u x1.  Right upper lip at maximal smile excursion 2u.     Hemostasis was excellent. The patient tolerated the procedure well and was discharged home in stable condition. She was instructed to avoid rubbing the injection site for at least 36 hours, keep her head elevated for 6 hours, minimize facial movement, and to call if they have questions or if problems arise.  Carlos Bhatt MD      Again, thank you for allowing me to participate in the care of your patient.        Sincerely,        Carlos Bhatt MD

## 2023-03-13 DIAGNOSIS — G51.8 OTHER DISORDERS OF FACIAL NERVE: Primary | ICD-10-CM

## 2023-03-17 NOTE — PROGRESS NOTES
CLINICAL SUMMARY:   Diagnoses:  1) Facial paralysis, left, etiology acoustic neuroma excision.  7/15/2003: paralysis onset date.   8/2003: first sign of recovery.  Had physical therapy for facial paralysis in the past.  Had botox sessions in the past x2: really benefited from botox in the past without complications.   8/21/2014: platysmaectomy by Dr. Howard with large benefit of decreased banding and pain.       Comorbidities: None  Pertinent medications: Fish oil  Photographs: [facility] consents signed December 4, 2014.   Other: N/A   Care Checklist:   _RTC as needed.           Patient presents for botox injection.       No problems after last botox injection.      The benefits of the botox treatment is wearing off. She has more drooling especially when brushing her teeth or drinking. Her eye has become droopy again. More pain and tightness of the left face. She has unwanted eye closure on the left side with eating and talking.            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      Postoperative diagnosis: same.      Procedure:  Botox injection, 49.5u total to the following locations:  Left  5u x1  Left lateral superior orbit 10u x1  Left lateral mid orbit 5u x1  Left lateral inferior orbit 10u x1  Left chin 2.5u x3.  Left lower lip/chin (MESHA) 5u x1.  Right lower lip/chin (MESHA) 5u x1.  Right upper lip at maximal smile excursion 2u.            Botox informaton: Allergan Onabotulinumtoxin A 100u/vial. Lot#[id number] Expiration 4/2025, NDC 7909-6646-25.      A preoperative discussion was held. The Ms. Coto stated she understood the risks, benefits, alternatives, and limitations of the procedure. The risks, including but not limited to, bleeding, discomfort at the injection site, headache, neck pain, dry mouth, tiredness, loss of strength in the body, hoarseness, trouble saying words, trouble breathing, trouble swallowing, eye problems such as double vision, blurred vision,  decreased eyesight, drooping eyelids, swelling of eyelids, dry eyes, and unforseen complications related to the procedure were discussed. I emphasized the possibility of worsening of her eye function given her history of facial paralysis and eye dryness. I also emphasized the temporary nature of the treatment and more treatment may be necessary in the future. She stated that her questions were answered to her satisfaction. She wished to undergo the procedure.      Procedure: After informed consent was obtained, the patient was prepped with an alcohol pad and marked. Reconstituted botox 100u with a 2mL dilution of unpreserved saline was used during the procedure.        49.5u total was injected at each of the following sites:    Left  5u x1  Left lateral superior orbit 10u x1  Left lateral mid orbit 5u x1  Left lateral inferior orbit 10u x1  Left chin 2.5u x3.  Left lower lip/chin (MESHA) 5u x1.  Right lower lip/chin (MESHA) 5u x1.  Right upper lip at maximal smile excursion 2u.     Hemostasis was excellent. The patient tolerated the procedure well and was discharged home in stable condition. She was instructed to avoid rubbing the injection site for at least 36 hours, keep her head elevated for 6 hours, minimize facial movement, and to call if they have questions or if problems arise.    Carlos Bhatt MD

## 2023-03-30 ENCOUNTER — OFFICE VISIT (OUTPATIENT)
Dept: OTOLARYNGOLOGY | Facility: CLINIC | Age: 63
End: 2023-03-30
Payer: COMMERCIAL

## 2023-03-30 VITALS — DIASTOLIC BLOOD PRESSURE: 73 MMHG | HEART RATE: 59 BPM | SYSTOLIC BLOOD PRESSURE: 118 MMHG

## 2023-03-30 DIAGNOSIS — G51.9 FACIAL NERVE DISORDER: Primary | ICD-10-CM

## 2023-03-30 PROCEDURE — 64612 DESTROY NERVE FACE MUSCLE: CPT | Mod: 50 | Performed by: OTOLARYNGOLOGY

## 2023-03-30 NOTE — NURSING NOTE
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     PCP: Naz Toledo    Referring Provider:  No referring provider defined for this encounter.    /73   Pulse 59

## 2023-03-30 NOTE — LETTER
3/30/2023         RE: Susanna Coto  [address]  [facility] [address]        Dear Colleague,    Thank you for referring your patient, Susanna Coto, to the [facility]. Please see a copy of my visit note below.    CLINICAL SUMMARY:   Diagnoses:  1) Facial paralysis, left, etiology acoustic neuroma excision.  7/15/2003: paralysis onset date.   8/2003: first sign of recovery.  Had physical therapy for facial paralysis in the past.  Had botox sessions in the past x2: really benefited from botox in the past without complications.   8/21/2014: platysmaectomy by Dr. Howard with large benefit of decreased banding and pain.       Comorbidities: None  Pertinent medications: Fish oil  Photographs: [facility] consents signed December 4, 2014.   Other: N/A   Care Checklist:   _RTC as needed.           Patient presents for botox injection.       No problems after last botox injection.      The benefits of the botox treatment is wearing off. She has more drooling especially when brushing her teeth or drinking. Her eye has become droopy again. More pain and tightness of the left face. She has unwanted eye closure on the left side with eating and talking.            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      Postoperative diagnosis: same.      Procedure:  Botox injection, 49.5u total to the following locations:  Left  5u x1  Left lateral superior orbit 10u x1  Left lateral mid orbit 5u x1  Left lateral inferior orbit 10u x1  Left chin 2.5u x3.  Left lower lip/chin (MESHA) 5u x1.  Right lower lip/chin (MESHA) 5u x1.  Right upper lip at maximal smile excursion 2u.            Botox informaton: Allergan Onabotulinumtoxin A 100u/vial. Lot#[id number] Expiration 4/2025, NDC 4082-3087-07.      A preoperative discussion was held. The Ms. Coto stated she understood the risks, benefits, alternatives, and limitations of the procedure. The risks, including but  not limited to, bleeding, discomfort at the injection site, headache, neck pain, dry mouth, tiredness, loss of strength in the body, hoarseness, trouble saying words, trouble breathing, trouble swallowing, eye problems such as double vision, blurred vision, decreased eyesight, drooping eyelids, swelling of eyelids, dry eyes, and unforseen complications related to the procedure were discussed. I emphasized the possibility of worsening of her eye function given her history of facial paralysis and eye dryness. I also emphasized the temporary nature of the treatment and more treatment may be necessary in the future. She stated that her questions were answered to her satisfaction. She wished to undergo the procedure.      Procedure: After informed consent was obtained, the patient was prepped with an alcohol pad and marked. Reconstituted botox 100u with a 2mL dilution of unpreserved saline was used during the procedure.        49.5u total was injected at each of the following sites:    Left  5u x1  Left lateral superior orbit 10u x1  Left lateral mid orbit 5u x1  Left lateral inferior orbit 10u x1  Left chin 2.5u x3.  Left lower lip/chin (MESHA) 5u x1.  Right lower lip/chin (MESHA) 5u x1.  Right upper lip at maximal smile excursion 2u.     Hemostasis was excellent. The patient tolerated the procedure well and was discharged home in stable condition. She was instructed to avoid rubbing the injection site for at least 36 hours, keep her head elevated for 6 hours, minimize facial movement, and to call if they have questions or if problems arise.    Carlos Bhatt MD       Again, thank you for allowing me to participate in the care of your patient.        Sincerely,        Carlos Bhatt MD

## 2023-04-27 ENCOUNTER — LAB REQUISITION (OUTPATIENT)
Dept: LAB | Facility: CLINIC | Age: 63
End: 2023-04-27

## 2023-04-27 DIAGNOSIS — Z01.419 ENCOUNTER FOR GYNECOLOGICAL EXAMINATION (GENERAL) (ROUTINE) WITHOUT ABNORMAL FINDINGS: ICD-10-CM

## 2023-04-27 PROCEDURE — G0123 SCREEN CERV/VAG THIN LAYER: HCPCS | Performed by: OBSTETRICS & GYNECOLOGY

## 2023-04-27 PROCEDURE — 87624 HPV HI-RISK TYP POOLED RSLT: CPT | Performed by: OBSTETRICS & GYNECOLOGY

## 2023-05-01 LAB
BKR LAB AP GYN ADEQUACY: NORMAL
BKR LAB AP GYN INTERPRETATION: NORMAL
BKR LAB AP HPV REFLEX: NORMAL
BKR LAB AP LMP: NORMAL
BKR LAB AP PREVIOUS ABNL DX: NORMAL
BKR LAB AP PREVIOUS ABNORMAL: NORMAL
PATH REPORT.COMMENTS IMP SPEC: NORMAL
PATH REPORT.COMMENTS IMP SPEC: NORMAL
PATH REPORT.RELEVANT HX SPEC: NORMAL

## 2023-05-03 LAB
HUMAN PAPILLOMA VIRUS 16 DNA: NEGATIVE
HUMAN PAPILLOMA VIRUS 18 DNA: NEGATIVE
HUMAN PAPILLOMA VIRUS FINAL DIAGNOSIS: NORMAL
HUMAN PAPILLOMA VIRUS OTHER HR: NEGATIVE

## 2023-11-16 DIAGNOSIS — G51.8 OTHER DISORDERS OF FACIAL NERVE: Primary | ICD-10-CM

## 2023-11-30 ENCOUNTER — OFFICE VISIT (OUTPATIENT)
Dept: OTOLARYNGOLOGY | Facility: CLINIC | Age: 63
End: 2023-11-30
Payer: COMMERCIAL

## 2023-11-30 VITALS — HEART RATE: 79 BPM | SYSTOLIC BLOOD PRESSURE: 120 MMHG | DIASTOLIC BLOOD PRESSURE: 78 MMHG

## 2023-11-30 DIAGNOSIS — G51.8 OTHER DISORDERS OF FACIAL NERVE: Primary | ICD-10-CM

## 2023-11-30 PROCEDURE — 64612 DESTROY NERVE FACE MUSCLE: CPT | Mod: 50 | Performed by: OTOLARYNGOLOGY

## 2023-11-30 NOTE — LETTER
11/30/2023         RE: Susanna Coto  [address] [facility] [address]        Dear Colleague,    Thank you for referring your patient, uSsanna Coto, to the [facility]. Please see a copy of my visit note below.    CLINICAL SUMMARY:   Diagnoses:  1) Facial paralysis, left, etiology acoustic neuroma excision.  7/15/2003: paralysis onset date.   8/2003: first sign of recovery.  Had physical therapy for facial paralysis in the past.  Had botox sessions in the past x2: really benefited from botox in the past without complications.   8/21/2014: platysmaectomy by Dr. Howard with large benefit of decreased banding and pain.       Comorbidities: None  Pertinent medications: Fish oil  Photographs: [facility] consents signed December 4, 2014.   Other: N/A   Care Checklist:   _RTC as needed.        Patient presents for botox injection.       No problems after last botox injection.      The benefits of the botox treatment is wearing off. She can tell she is overdue for her botox injections. She is drooling much more with the botox worn off. She has twitching of her eye and unwanted eye closures. She has pain and tightness of the left face.             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      Postoperative diagnosis: same.      Procedure:  Botox injection, 49.5u total to the following locations:  Left  5u x1  Left lateral superior orbit 10u x1  Left lateral mid orbit 5u x1  Left lateral inferior orbit 10u x1  Left chin 2.5u x3.  Left lower lip/chin (MESHA) 5u x1.  Right lower lip/chin (MESHA) 5u x1.  Right upper lip at maximal smile excursion 2u.            Botox informaton: Allergan Onabotulinumtoxin A 100u/vial. Lot#[id number] Expiration 3/2026, NDC 0404-0624-67.      A preoperative discussion was held. The Ms. Coto stated she understood the risks, benefits, alternatives, and limitations of the procedure. The risks, including but not limited  to, bleeding, discomfort at the injection site, headache, neck pain, dry mouth, tiredness, loss of strength in the body, hoarseness, trouble saying words, trouble breathing, trouble swallowing, eye problems such as double vision, blurred vision, decreased eyesight, drooping eyelids, swelling of eyelids, dry eyes, and unforseen complications related to the procedure were discussed. I emphasized the possibility of worsening of her eye function given her history of facial paralysis and eye dryness. I also emphasized the temporary nature of the treatment and more treatment may be necessary in the future. She stated that her questions were answered to her satisfaction. She wished to undergo the procedure.      Procedure: After informed consent was obtained, the patient was prepped with an alcohol pad and marked. Reconstituted botox 100u with a 2mL dilution of unpreserved saline was used during the procedure.        49.5u total was injected at each of the following sites:    Left  5u x1  Left lateral superior orbit 10u x1  Left lateral mid orbit 5u x1  Left lateral inferior orbit 10u x1  Left chin 2.5u x3.  Left lower lip/chin (MESHA) 5u x1.  Right lower lip/chin (MESHA) 5u x1.  Right upper lip at maximal smile excursion 2u.     Hemostasis was excellent. The patient tolerated the procedure well and was discharged home in stable condition. She was instructed to avoid rubbing the injection site for at least 36 hours, keep her head elevated for 6 hours, minimize facial movement, and to call if they have questions or if problems arise.     Carlos Bhatt MD       Again, thank you for allowing me to participate in the care of your patient.        Sincerely,        Carlos Bhatt MD

## 2023-11-30 NOTE — NURSING NOTE
Susanna Coto's chief complaint for this visit includes:  Chief Complaint   Patient presents with    Procedure     Botox     PCP: Naz Toledo    Referring Provider:  No referring provider defined for this encounter.    /78   Pulse 79

## 2023-11-30 NOTE — PROGRESS NOTES
CLINICAL SUMMARY:   Diagnoses:  1) Facial paralysis, left, etiology acoustic neuroma excision.  7/15/2003: paralysis onset date.   8/2003: first sign of recovery.  Had physical therapy for facial paralysis in the past.  Had botox sessions in the past x2: really benefited from botox in the past without complications.   8/21/2014: platysmaectomy by Dr. Howard with large benefit of decreased banding and pain.       Comorbidities: None  Pertinent medications: Fish oil  Photographs: [facility] consents signed December 4, 2014.   Other: N/A   Care Checklist:   _RTC as needed.        Patient presents for botox injection.       No problems after last botox injection.      The benefits of the botox treatment is wearing off. She can tell she is overdue for her botox injections. She is drooling much more with the botox worn off. She has twitching of her eye and unwanted eye closures. She has pain and tightness of the left face.             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      Postoperative diagnosis: same.      Procedure:  Botox injection, 49.5u total to the following locations:  Left  5u x1  Left lateral superior orbit 10u x1  Left lateral mid orbit 5u x1  Left lateral inferior orbit 10u x1  Left chin 2.5u x3.  Left lower lip/chin (MESHA) 5u x1.  Right lower lip/chin (MESHA) 5u x1.  Right upper lip at maximal smile excursion 2u.            Botox informaton: Allergan Onabotulinumtoxin A 100u/vial. Lot#[id number] Expiration 3/2026, NDC 0297-1907-48.      A preoperative discussion was held. The Ms. Coto stated she understood the risks, benefits, alternatives, and limitations of the procedure. The risks, including but not limited to, bleeding, discomfort at the injection site, headache, neck pain, dry mouth, tiredness, loss of strength in the body, hoarseness, trouble saying words, trouble breathing, trouble swallowing, eye problems such as double vision, blurred vision, decreased eyesight,  drooping eyelids, swelling of eyelids, dry eyes, and unforseen complications related to the procedure were discussed. I emphasized the possibility of worsening of her eye function given her history of facial paralysis and eye dryness. I also emphasized the temporary nature of the treatment and more treatment may be necessary in the future. She stated that her questions were answered to her satisfaction. She wished to undergo the procedure.      Procedure: After informed consent was obtained, the patient was prepped with an alcohol pad and marked. Reconstituted botox 100u with a 2mL dilution of unpreserved saline was used during the procedure.        49.5u total was injected at each of the following sites:    Left  5u x1  Left lateral superior orbit 10u x1  Left lateral mid orbit 5u x1  Left lateral inferior orbit 10u x1  Left chin 2.5u x3.  Left lower lip/chin (MESHA) 5u x1.  Right lower lip/chin (MESHA) 5u x1.  Right upper lip at maximal smile excursion 2u.     Hemostasis was excellent. The patient tolerated the procedure well and was discharged home in stable condition. She was instructed to avoid rubbing the injection site for at least 36 hours, keep her head elevated for 6 hours, minimize facial movement, and to call if they have questions or if problems arise.     Carlos Bhatt MD

## 2024-03-18 DIAGNOSIS — G51.8 OTHER DISORDERS OF FACIAL NERVE: Primary | ICD-10-CM

## 2024-04-04 ENCOUNTER — OFFICE VISIT (OUTPATIENT)
Dept: OTOLARYNGOLOGY | Facility: CLINIC | Age: 64
End: 2024-04-04
Payer: COMMERCIAL

## 2024-04-04 VITALS — DIASTOLIC BLOOD PRESSURE: 73 MMHG | HEART RATE: 80 BPM | SYSTOLIC BLOOD PRESSURE: 110 MMHG

## 2024-04-04 DIAGNOSIS — G51.8 OTHER DISORDERS OF FACIAL NERVE: Primary | ICD-10-CM

## 2024-04-04 PROCEDURE — 64612 DESTROY NERVE FACE MUSCLE: CPT | Mod: 50 | Performed by: OTOLARYNGOLOGY

## 2024-04-04 NOTE — PROGRESS NOTES
CLINICAL SUMMARY:   Diagnoses:  1) Facial paralysis, left, etiology acoustic neuroma excision.  7/15/2003: paralysis onset date.   8/2003: first sign of recovery.  Had physical therapy for facial paralysis in the past.  Had botox sessions in the past x2: really benefited from botox in the past without complications.   8/21/2014: platysmaectomy by Dr. Howard with large benefit of decreased banding and pain.       Comorbidities: None  Pertinent medications: Fish oil  Photographs: [facility] consents signed December 4, 2014.   Other: N/A   Care Checklist:   _RTC as needed.         Patient presents for botox injection.       No problems after last botox injection.      The benefits of the botox treatment is wearing off. She can tell she could benefit from botox injections. She still experiences drooling now that the botox has worn off. She has pain and tightness of the left face and of the left side of her nose. The previous dosages has worked well for her and she wants to keep the same treatment plan.       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      Postoperative diagnosis: same.      Procedure:  Botox injection, 49.5u total to the following locations:  Left  5u x1  Left lateral superior orbit 10u x1  Left lateral mid orbit 5u x1  Left lateral inferior orbit 10u x1  Left chin 2.5u x3.  Left lower lip/chin (MESHA) 5u x1.  Right lower lip/chin (MESHA) 5u x1.  Right upper lip at maximal smile excursion 2u.            Botox informaton: Allergan Onabotulinumtoxin A 100u/vial. Lot#[id number] 04/2026, NDC 3572-1959-68.      A preoperative discussion was held. The Ms. Coto stated she understood the risks, benefits, alternatives, and limitations of the procedure. The risks, including but not limited to, bleeding, discomfort at the injection site, headache, neck pain, dry mouth, tiredness, loss of strength in the body, hoarseness, trouble saying words, trouble breathing, trouble  swallowing, eye problems such as double vision, blurred vision, decreased eyesight, drooping eyelids, swelling of eyelids, dry eyes, and unforseen complications related to the procedure were discussed. I emphasized the possibility of worsening of her eye function given her history of facial paralysis and eye dryness. I also emphasized the temporary nature of the treatment and more treatment may be necessary in the future. She stated that her questions were answered to her satisfaction. She wished to undergo the procedure.      Procedure: After informed consent was obtained, the patient was prepped with an alcohol pad and marked. Reconstituted botox 100u with a 2mL dilution of unpreserved saline was used during the procedure.        49.5u total was injected at each of the following sites:    Left  5u x1  Left lateral superior orbit 10u x1  Left lateral mid orbit 5u x1  Left lateral inferior orbit 10u x1  Left chin 2.5u x3.  Left lower lip/chin (MESHA) 5u x1.  Right lower lip/chin (MESHA) 5u x1.  Right upper lip at maximal smile excursion 2u.     Hemostasis was excellent. The patient tolerated the procedure well and was discharged home in stable condition. She was instructed to avoid rubbing the injection site for at least 36 hours, keep her head elevated for 6 hours, minimize facial movement, and to call if they have questions or if problems arise.     Carlos Bhatt MD

## 2024-04-04 NOTE — LETTER
4/4/2024         RE: Susanna Coto  [address] [facility] [id number]        Dear Colleague,    Thank you for referring your patient, Susanna Coto, to the [facility]. Please see a copy of my visit note below.    CLINICAL SUMMARY:   Diagnoses:  1) Facial paralysis, left, etiology acoustic neuroma excision.  7/15/2003: paralysis onset date.   8/2003: first sign of recovery.  Had physical therapy for facial paralysis in the past.  Had botox sessions in the past x2: really benefited from botox in the past without complications.   8/21/2014: platysmaectomy by Dr. Howard with large benefit of decreased banding and pain.       Comorbidities: None  Pertinent medications: Fish oil  Photographs: [facility] consents signed December 4, 2014.   Other: N/A   Care Checklist:   _RTC as needed.         Patient presents for botox injection.       No problems after last botox injection.      The benefits of the botox treatment is wearing off. She can tell she could benefit from botox injections. She still experiences drooling now that the botox has worn off. She has pain and tightness of the left face and of the left side of her nose. The previous dosages has worked well for her and she wants to keep the same treatment plan.       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      Postoperative diagnosis: same.      Procedure:  Botox injection, 49.5u total to the following locations:  Left  5u x1  Left lateral superior orbit 10u x1  Left lateral mid orbit 5u x1  Left lateral inferior orbit 10u x1  Left chin 2.5u x3.  Left lower lip/chin (MESHA) 5u x1.  Right lower lip/chin (MESHA) 5u x1.  Right upper lip at maximal smile excursion 2u.            Botox informaton: Allergan Onabotulinumtoxin A 100u/vial. Lot#[id number] 04/2026, NDC 3344-7522-30.      A preoperative discussion was held. The Ms. Coto stated she understood the risks, benefits,  alternatives, and limitations of the procedure. The risks, including but not limited to, bleeding, discomfort at the injection site, headache, neck pain, dry mouth, tiredness, loss of strength in the body, hoarseness, trouble saying words, trouble breathing, trouble swallowing, eye problems such as double vision, blurred vision, decreased eyesight, drooping eyelids, swelling of eyelids, dry eyes, and unforseen complications related to the procedure were discussed. I emphasized the possibility of worsening of her eye function given her history of facial paralysis and eye dryness. I also emphasized the temporary nature of the treatment and more treatment may be necessary in the future. She stated that her questions were answered to her satisfaction. She wished to undergo the procedure.      Procedure: After informed consent was obtained, the patient was prepped with an alcohol pad and marked. Reconstituted botox 100u with a 2mL dilution of unpreserved saline was used during the procedure.        49.5u total was injected at each of the following sites:    Left  5u x1  Left lateral superior orbit 10u x1  Left lateral mid orbit 5u x1  Left lateral inferior orbit 10u x1  Left chin 2.5u x3.  Left lower lip/chin (MESHA) 5u x1.  Right lower lip/chin (MESHA) 5u x1.  Right upper lip at maximal smile excursion 2u.     Hemostasis was excellent. The patient tolerated the procedure well and was discharged home in stable condition. She was instructed to avoid rubbing the injection site for at least 36 hours, keep her head elevated for 6 hours, minimize facial movement, and to call if they have questions or if problems arise.     Carlos Bhatt MD       Again, thank you for allowing me to participate in the care of your patient.        Sincerely,        Carlos Bhatt MD

## 2024-04-04 NOTE — NURSING NOTE
Susanna Coto's chief complaint for this visit includes:  Chief Complaint   Patient presents with    Follow Up     Botox Therapeutic.      PCP: Naz Toledo    Referring Provider:  No referring provider defined for this encounter.    /73   Pulse 80

## 2024-04-18 ENCOUNTER — LAB REQUISITION (OUTPATIENT)
Dept: LAB | Facility: CLINIC | Age: 64
End: 2024-04-18

## 2024-04-18 DIAGNOSIS — Z01.419 ENCOUNTER FOR GYNECOLOGICAL EXAMINATION (GENERAL) (ROUTINE) WITHOUT ABNORMAL FINDINGS: ICD-10-CM

## 2024-04-18 PROCEDURE — 87624 HPV HI-RISK TYP POOLED RSLT: CPT | Performed by: OBSTETRICS & GYNECOLOGY

## 2024-04-18 PROCEDURE — G0123 SCREEN CERV/VAG THIN LAYER: HCPCS | Performed by: OBSTETRICS & GYNECOLOGY

## 2024-09-13 DIAGNOSIS — G51.8 OTHER DISORDERS OF FACIAL NERVE: Primary | ICD-10-CM

## 2024-09-13 DIAGNOSIS — G51.9 FACIAL NERVE DISORDER: ICD-10-CM

## 2024-09-30 NOTE — PROGRESS NOTES
CLINICAL SUMMARY:   Diagnoses:  1) Facial paralysis, left, etiology acoustic neuroma excision.  7/15/2003: paralysis onset date.   8/2003: first sign of recovery.  Had physical therapy for facial paralysis in the past.  Had botox sessions in the past x2: really benefited from botox in the past without complications.   8/21/2014: platysmaectomy by Dr. Howard with large benefit of decreased banding and pain.       Comorbidities: None  Pertinent medications: Fish oil  Photographs: [facility] consents signed December 4, 2014.   Other: N/A   Care Checklist:   _RTC as needed.      Patient presents for botox injection.       No problems after last botox injection.      The benefits of the botox treatment is wearing off. She is experiencing drooling again now that the botox has worn off. She is noticing food pocketing on the left side again over the last 6 weeks. She has pain and tightness of the left face, especially the left side of her nose. The previous dosages has worked well for her and she wants to keep the same treatment plan.         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      Postoperative diagnosis: same.      Procedure:  Botox injection, 49.5u total to the following locations:  Left  5u x1  Left lateral superior orbit 10u x1  Left lateral mid orbit 5u x1  Left lateral inferior orbit 10u x1  Left chin 2.5u x3.  Left lower lip/chin (MESHA) 5u x1.  Right lower lip/chin (MESHA) 5u x1.  Right upper lip at maximal smile excursion 2u.            Botox informaton: Allergan Onabotulinumtoxin A 100u/vial. Lot#[id number] Expiration 11/2026, NDC 9347-1962-21.      A preoperative discussion was held. The Ms. Coto stated she understood the risks, benefits, alternatives, and limitations of the procedure. The risks, including but not limited to, bleeding, discomfort at the injection site, headache, neck pain, dry mouth, tiredness, loss of strength in the body, hoarseness, trouble saying words,  trouble breathing, trouble swallowing, eye problems such as double vision, blurred vision, decreased eyesight, drooping eyelids, swelling of eyelids, dry eyes, and unforseen complications related to the procedure were discussed. I emphasized the possibility of worsening of her eye function given her history of facial paralysis and eye dryness. I also emphasized the temporary nature of the treatment and more treatment may be necessary in the future. She stated that her questions were answered to her satisfaction. She wished to undergo the procedure.      Procedure: After informed consent was obtained, the patient was prepped with an alcohol pad and marked. Reconstituted botox 100u with a 2mL dilution of unpreserved saline was used during the procedure.        49.5u total was injected at each of the following sites:    Left  5u x1  Left lateral superior orbit 10u x1  Left lateral mid orbit 5u x1  Left lateral inferior orbit 10u x1  Left chin 2.5u x3.  Left lower lip/chin (MESHA) 5u x1.  Right lower lip/chin (MESHA) 5u x1.  Right upper lip at maximal smile excursion 2u.    50.5u were wasted.     Hemostasis was excellent. The patient tolerated the procedure well and was discharged home in stable condition. She was instructed to avoid rubbing the injection site for at least 36 hours, keep her head elevated for 6 hours, minimize facial movement, and to call if they have questions or if problems arise.     Carlos Bhatt MD

## 2024-10-10 ENCOUNTER — OFFICE VISIT (OUTPATIENT)
Dept: OTOLARYNGOLOGY | Facility: CLINIC | Age: 64
End: 2024-10-10
Payer: COMMERCIAL

## 2024-10-10 DIAGNOSIS — G51.8 OTHER DISORDERS OF FACIAL NERVE: Primary | ICD-10-CM

## 2024-10-10 PROCEDURE — 64612 DESTROY NERVE FACE MUSCLE: CPT | Mod: 50 | Performed by: OTOLARYNGOLOGY

## 2024-10-10 NOTE — NURSING NOTE
Susanna Coto's chief complaint for this visit includes:  Chief Complaint   Patient presents with    Procedure     Therapeutic Botox injections. Doing well, no concerns today.     PCP: Naz Toledo    Referring Provider:  Referred Self, MD  No address on file    There were no vitals taken for this visit.      Alex Mariee, EMT  October 10, 2024

## 2024-10-10 NOTE — LETTER
10/10/2024      Susanna Coto  [address] [facility] [id number]      Dear Colleague,    Thank you for referring your patient, Susanna Coto, to the [facility]. Please see a copy of my visit note below.    CLINICAL SUMMARY:   Diagnoses:  1) Facial paralysis, left, etiology acoustic neuroma excision.  7/15/2003: paralysis onset date.   8/2003: first sign of recovery.  Had physical therapy for facial paralysis in the past.  Had botox sessions in the past x2: really benefited from botox in the past without complications.   8/21/2014: platysmaectomy by Dr. Howard with large benefit of decreased banding and pain.       Comorbidities: None  Pertinent medications: Fish oil  Photographs: [facility] consents signed December 4, 2014.   Other: N/A   Care Checklist:   _RTC as needed.      Patient presents for botox injection.       No problems after last botox injection.      The benefits of the botox treatment is wearing off. She is experiencing drooling again now that the botox has worn off. She is noticing food pocketing on the left side again over the last 6 weeks. She has pain and tightness of the left face, especially the left side of her nose. The previous dosages has worked well for her and she wants to keep the same treatment plan.         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      Postoperative diagnosis: same.      Procedure:  Botox injection, 49.5u total to the following locations:  Left  5u x1  Left lateral superior orbit 10u x1  Left lateral mid orbit 5u x1  Left lateral inferior orbit 10u x1  Left chin 2.5u x3.  Left lower lip/chin (MESHA) 5u x1.  Right lower lip/chin (MESHA) 5u x1.  Right upper lip at maximal smile excursion 2u.            Botox informaton: Allergan Onabotulinumtoxin A 100u/vial. Lot#[id number] Expiration 11/2026, NDC 5559-8965-79.      A preoperative discussion was held. The Ms. Coto stated she understood the risks,  benefits, alternatives, and limitations of the procedure. The risks, including but not limited to, bleeding, discomfort at the injection site, headache, neck pain, dry mouth, tiredness, loss of strength in the body, hoarseness, trouble saying words, trouble breathing, trouble swallowing, eye problems such as double vision, blurred vision, decreased eyesight, drooping eyelids, swelling of eyelids, dry eyes, and unforseen complications related to the procedure were discussed. I emphasized the possibility of worsening of her eye function given her history of facial paralysis and eye dryness. I also emphasized the temporary nature of the treatment and more treatment may be necessary in the future. She stated that her questions were answered to her satisfaction. She wished to undergo the procedure.      Procedure: After informed consent was obtained, the patient was prepped with an alcohol pad and marked. Reconstituted botox 100u with a 2mL dilution of unpreserved saline was used during the procedure.        49.5u total was injected at each of the following sites:    Left  5u x1  Left lateral superior orbit 10u x1  Left lateral mid orbit 5u x1  Left lateral inferior orbit 10u x1  Left chin 2.5u x3.  Left lower lip/chin (MESHA) 5u x1.  Right lower lip/chin (MESHA) 5u x1.  Right upper lip at maximal smile excursion 2u.    50.5u were wasted.     Hemostasis was excellent. The patient tolerated the procedure well and was discharged home in stable condition. She was instructed to avoid rubbing the injection site for at least 36 hours, keep her head elevated for 6 hours, minimize facial movement, and to call if they have questions or if problems arise.     Carlos Bhatt MD       Again, thank you for allowing me to participate in the care of your patient.        Sincerely,        Carlos Bhatt MD

## 2025-03-05 NOTE — NURSING NOTE
Susanna Coto's chief complaint for this visit includes:  Chief Complaint   Patient presents with     Follow Up     Botox Facial paralysis     PCP: Naz Toledo    Referring Provider:  No referring provider defined for this encounter.    There were no vitals taken for this visit.  Moderate Pain (4)        Allergies   Allergen Reactions     Penicillins      Clindamycin Rash     Latex Itching and Rash         Do you need any medication refills at today's visit? No    Alexandra Cassidy CMA        
Susanna Coto's goals for this visit include:   Chief Complaint   Patient presents with     Follow Up     Botox Facial paralysis       She requests these members of her care team be copied on today's visit information: yes    PCP: Naz Toledo    Referring Provider:  No referring provider defined for this encounter.      
Appearance: Dressed appropriately.  Attitude / Behavior / relatedness: Calm, cooperative, pleasant  Eye contact: good  Motor: No abnormal movements, no psychomotor slowing or activation.  Speech: Regular rate.  Mood: "feel good"  Affect: euthymic   Thought Process: linear, logical   Thought Content: denies SI/HI   Perceptual: +AVH (these are now positive AVH)  Insight: fair  Judgment: fair  Impulse control: good    Cognition: grossly intact  Gait: Intact.  
Appearance: Dressed appropriately.  Attitude / Behavior / relatedness: Calm, cooperative, pleasant  Eye contact: good  Motor: No abnormal movements, no psychomotor slowing or activation.  Speech: Regular rate.  Mood: "feel good"  Affect: euthymic   Thought Process: linear, logical   Thought Content: denies SI/HI   Perceptual: +AVH (these are now positive AVH)  Insight: fair  Judgment: fair  Impulse control: good    Cognition: grossly intact  Gait: Intact.  
Appearance: Dressed appropriately.  Attitude / Behavior / relatedness: Calm, cooperative, pleasant  Eye contact: good  Motor: No abnormal movements, no psychomotor slowing or activation.  Speech: Regular rate.  Mood: "very well"  Affect: euthymic   Thought Process: linear, logical   Thought Content: denies SI/HI   Perceptual: +AVH (these are now positive AVH)  Insight: fair  Judgment: fair  Impulse control: good    Cognition: grossly intact  Gait: Intact.  
Appearance: Dressed appropriately.  Attitude / Behavior / relatedness: Calm, cooperative, pleasant  Eye contact: good  Motor: No abnormal movements, no psychomotor slowing or activation.  Speech: Regular rate.  Mood: "feel better"  Affect: euthymic   Thought Process: linear, logical   Thought Content: denies SI/HI   Perceptual: +AVH (these are now positive AVH)  Insight: fair  Judgment: fair  Impulse control: good    Cognition: grossly intact  Gait: Intact.

## 2025-03-11 DIAGNOSIS — G51.8 OTHER DISORDERS OF FACIAL NERVE: Primary | ICD-10-CM

## 2025-04-03 NOTE — PROGRESS NOTES
CLINICAL SUMMARY:   Diagnoses:  1) Facial paralysis, left, etiology acoustic neuroma excision.  7/15/2003: paralysis onset date.   8/2003: first sign of recovery.  Had physical therapy for facial paralysis in the past.  Had botox sessions in the past x2: really benefited from botox in the past without complications.   8/21/2014: platysmaectomy by Dr. Howard with large benefit of decreased banding and pain.       Comorbidities: None  Pertinent medications: Fish oil  Photographs: [facility] consents signed December 4, 2014.   Other: N/A   Care Checklist:   _RTC as needed.        Ms. Coto returns today for consideration of botox treatment. Her symptoms have come back she has significant tightness of her left face and some oral incompetence. These symptoms are usually resolved with botox.     However, she recently had worsening facial redness especially around the eyes and mouth (areas where she receives botox). This may have been rosacea but needed extended antibiotic treatment.  She still has some symptoms and the rash has recently resolved. She wonders if botox could worsen her condition. She is leaving for a trip and does not want any risk of worsening or recurrence of her rash and rosacea. We discussed doing the botox treatments today because they certainly indicated due to her symptoms, but she wants to  postpone the botox treatment for now to give more time to be rash free and risking a flare before any treatments in the area. She will return when ready for botox treatment.     Carlos Bhatt MD

## 2025-04-17 ENCOUNTER — OFFICE VISIT (OUTPATIENT)
Dept: OTOLARYNGOLOGY | Facility: CLINIC | Age: 65
End: 2025-04-17
Payer: COMMERCIAL

## 2025-04-17 DIAGNOSIS — G51.8 OTHER DISORDERS OF FACIAL NERVE: Primary | ICD-10-CM

## 2025-04-17 NOTE — LETTER
4/17/2025      Susanna Coto  [address] [facility] [id number]      Dear Colleague,    Thank you for referring your patient, Susanna Coto, to the [facility]. Please see a copy of my visit note below.    CLINICAL SUMMARY:   Diagnoses:  1) Facial paralysis, left, etiology acoustic neuroma excision.  7/15/2003: paralysis onset date.   8/2003: first sign of recovery.  Had physical therapy for facial paralysis in the past.  Had botox sessions in the past x2: really benefited from botox in the past without complications.   8/21/2014: platysmaectomy by Dr. Howard with large benefit of decreased banding and pain.       Comorbidities: None  Pertinent medications: Fish oil  Photographs: [facility] consents signed December 4, 2014.   Other: N/A   Care Checklist:   _RTC as needed.        Ms. Coto returns today for consideration of botox treatment. Her symptoms have come back she has significant tightness of her left face and some oral incompetence. These symptoms are usually resolved with botox.     However, she recently had worsening facial redness especially around the eyes and mouth (areas where she receives botox). This may have been rosacea but needed extended antibiotic treatment.  She still has some symptoms and the rash has recently resolved. She wonders if botox could worsen her condition. She is leaving for a trip and does not want any risk of worsening or recurrence of her rash and rosacea. We discussed doing the botox treatments today because they certainly indicated due to her symptoms, but she wants to  postpone the botox treatment for now to give more time to be rash free and risking a flare before any treatments in the area. She will return when ready for botox treatment.     Carlos Bhatt MD       Again, thank you for allowing me to participate in the care of your patient.        Sincerely,        Carlos Bhatt MD    Electronically signed

## 2025-04-17 NOTE — NURSING NOTE
Susanna Coto's chief complaint for this visit includes:  Chief Complaint   Patient presents with    Procedure     Therapeutic Botox injections.     PCP: Naz Toledo    Referring Provider:  Referred Self, MD  No address on file    There were no vitals taken for this visit.

## 2025-06-19 NOTE — PROGRESS NOTES
CLINICAL SUMMARY:   Diagnoses:  1) Facial paralysis, left, etiology acoustic neuroma excision.  7/15/2003: paralysis onset date.   8/2003: first sign of recovery.  Had physical therapy for facial paralysis in the past.  Had botox sessions in the past x2: really benefited from botox in the past without complications.   8/21/2014: platysmaectomy by Dr. Howard with large benefit of decreased banding and pain.       Comorbidities: None  Pertinent medications: Fish oil  Photographs: [facility] consents signed December 4, 2014.   Other: N/A   Care Checklist:   _RTC as needed.  _Hold CPAP for 2-3 days after botox treatment.         Patient presents for botox injection.       No problems after last botox injection.      The benefits of the botox treatment is wearing off. She notices eye tightening and food pocketing. Drinking out of the bottle has been challenging and has leaking fluid from her mouth when drinking. The previous dosages has worked well for her and she wants to keep the same treatment plan.         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      Postoperative diagnosis: same.      Procedure:  Botox injection, 49.5u total to the following locations:  Left  5u x1  Left lateral superior orbit 10u x1  Left lateral mid orbit 5u x1  Left lateral inferior orbit 10u x1  Left chin 2.5u x3.  Left lower lip/chin (MESHA) 5u x1.  Right lower lip/chin (MESHA) 5u x1.  Right upper lip at maximal smile excursion 2u.            Botox informaton: Allergan Onabotulinumtoxin A 100u/vial. Lot#[id number] Expiration 10/2027, NDC 4983-0075-88.      A preoperative discussion was held. The Ms. Coto stated she understood the risks, benefits, alternatives, and limitations of the procedure. The risks, including but not limited to, bleeding, discomfort at the injection site, headache, neck pain, dry mouth, tiredness, loss of strength in the body, hoarseness, trouble saying words, trouble breathing, trouble  swallowing, eye problems such as double vision, blurred vision, decreased eyesight, drooping eyelids, swelling of eyelids, dry eyes, and unforseen complications related to the procedure were discussed. I emphasized the possibility of worsening of her eye function given her history of facial paralysis and eye dryness. I also emphasized the temporary nature of the treatment and more treatment may be necessary in the future. She stated that her questions were answered to her satisfaction. She wished to undergo the procedure.      Procedure: After informed consent was obtained, the patient was prepped with an alcohol pad and marked. Reconstituted botox 100u with a 2mL dilution of unpreserved saline was used during the procedure.        49.5u total was injected at each of the following sites:    Left  5u x1  Left lateral superior orbit 10u x1  Left lateral mid orbit 5u x1  Left lateral inferior orbit 10u x1  Left chin 2.5u x3.  Left lower lip/chin (MESHA) 5u x1.  Right lower lip/chin (MESHA) 5u x1.  Right upper lip at maximal smile excursion 2u.     50.5u were wasted.

## 2025-06-26 ENCOUNTER — OFFICE VISIT (OUTPATIENT)
Dept: OTOLARYNGOLOGY | Facility: CLINIC | Age: 65
End: 2025-06-26
Payer: COMMERCIAL

## 2025-06-26 DIAGNOSIS — G51.8 OTHER DISORDERS OF FACIAL NERVE: Primary | ICD-10-CM

## 2025-06-26 NOTE — LETTER
6/26/2025      Susanna Coto  [address] [facility] [id number]      Dear Colleague,    Thank you for referring your patient, Susanna Coto, to the [facility]. Please see a copy of my visit note below.    CLINICAL SUMMARY:   Diagnoses:  1) Facial paralysis, left, etiology acoustic neuroma excision.  7/15/2003: paralysis onset date.   8/2003: first sign of recovery.  Had physical therapy for facial paralysis in the past.  Had botox sessions in the past x2: really benefited from botox in the past without complications.   8/21/2014: platysmaectomy by Dr. Howard with large benefit of decreased banding and pain.       Comorbidities: None  Pertinent medications: Fish oil  Photographs: [facility] consents signed December 4, 2014.   Other: N/A   Care Checklist:   _RTC as needed.  _Hold CPAP for 2-3 days after botox treatment.         Patient presents for botox injection.       No problems after last botox injection.      The benefits of the botox treatment is wearing off. She notices eye tightening and food pocketing. Drinking out of the bottle has been challenging and has leaking fluid from her mouth when drinking. The previous dosages has worked well for her and she wants to keep the same treatment plan.         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      Postoperative diagnosis: same.      Procedure:  Botox injection, 49.5u total to the following locations:  Left  5u x1  Left lateral superior orbit 10u x1  Left lateral mid orbit 5u x1  Left lateral inferior orbit 10u x1  Left chin 2.5u x3.  Left lower lip/chin (MESHA) 5u x1.  Right lower lip/chin (MESHA) 5u x1.  Right upper lip at maximal smile excursion 2u.            Botox informaton: Allergan Onabotulinumtoxin A 100u/vial. Lot#[id number] Expiration 10/2027, NDC 6542-0342-77.      A preoperative discussion was held. The Ms. Coto stated she understood the risks, benefits, alternatives, and  limitations of the procedure. The risks, including but not limited to, bleeding, discomfort at the injection site, headache, neck pain, dry mouth, tiredness, loss of strength in the body, hoarseness, trouble saying words, trouble breathing, trouble swallowing, eye problems such as double vision, blurred vision, decreased eyesight, drooping eyelids, swelling of eyelids, dry eyes, and unforseen complications related to the procedure were discussed. I emphasized the possibility of worsening of her eye function given her history of facial paralysis and eye dryness. I also emphasized the temporary nature of the treatment and more treatment may be necessary in the future. She stated that her questions were answered to her satisfaction. She wished to undergo the procedure.      Procedure: After informed consent was obtained, the patient was prepped with an alcohol pad and marked. Reconstituted botox 100u with a 2mL dilution of unpreserved saline was used during the procedure.        49.5u total was injected at each of the following sites:    Left  5u x1  Left lateral superior orbit 10u x1  Left lateral mid orbit 5u x1  Left lateral inferior orbit 10u x1  Left chin 2.5u x3.  Left lower lip/chin (MESHA) 5u x1.  Right lower lip/chin (MESHA) 5u x1.  Right upper lip at maximal smile excursion 2u.     50.5u were wasted.    Again, thank you for allowing me to participate in the care of your patient.        Sincerely,        Carlos Bhatt MD    Electronically signed